# Patient Record
Sex: FEMALE | Race: BLACK OR AFRICAN AMERICAN | NOT HISPANIC OR LATINO | Employment: FULL TIME | URBAN - METROPOLITAN AREA
[De-identification: names, ages, dates, MRNs, and addresses within clinical notes are randomized per-mention and may not be internally consistent; named-entity substitution may affect disease eponyms.]

---

## 2023-04-28 ENCOUNTER — APPOINTMENT (OUTPATIENT)
Dept: PRIMARY CARE | Facility: CLINIC | Age: 28
End: 2023-04-28
Payer: COMMERCIAL

## 2023-05-09 ENCOUNTER — OFFICE VISIT (OUTPATIENT)
Dept: PRIMARY CARE | Facility: CLINIC | Age: 28
End: 2023-05-09
Payer: COMMERCIAL

## 2023-05-09 ENCOUNTER — LAB (OUTPATIENT)
Dept: LAB | Facility: LAB | Age: 28
End: 2023-05-09
Payer: COMMERCIAL

## 2023-05-09 VITALS
HEART RATE: 78 BPM | BODY MASS INDEX: 43.53 KG/M2 | SYSTOLIC BLOOD PRESSURE: 133 MMHG | DIASTOLIC BLOOD PRESSURE: 81 MMHG | TEMPERATURE: 97.5 F | WEIGHT: 230.4 LBS | OXYGEN SATURATION: 98 %

## 2023-05-09 DIAGNOSIS — Z02.1 PHYSICAL EXAM, PRE-EMPLOYMENT: ICD-10-CM

## 2023-05-09 DIAGNOSIS — Z00.00 HEALTHCARE MAINTENANCE: ICD-10-CM

## 2023-05-09 DIAGNOSIS — Z00.00 HEALTHCARE MAINTENANCE: Primary | ICD-10-CM

## 2023-05-09 DIAGNOSIS — Z30.9 ENCOUNTER FOR CONTRACEPTIVE MANAGEMENT, UNSPECIFIED TYPE: ICD-10-CM

## 2023-05-09 DIAGNOSIS — Z59.41 FOOD INSECURITY: ICD-10-CM

## 2023-05-09 LAB
ESTIMATED AVERAGE GLUCOSE FOR HBA1C: 103 MG/DL
HEMOGLOBIN A1C/HEMOGLOBIN TOTAL IN BLOOD: 5.2 %
HEPATITIS C VIRUS AB PRESENCE IN SERUM: NONREACTIVE
PREGNANCY TEST URINE, POC: NEGATIVE

## 2023-05-09 PROCEDURE — 99395 PREV VISIT EST AGE 18-39: CPT | Performed by: STUDENT IN AN ORGANIZED HEALTH CARE EDUCATION/TRAINING PROGRAM

## 2023-05-09 PROCEDURE — 1036F TOBACCO NON-USER: CPT | Performed by: STUDENT IN AN ORGANIZED HEALTH CARE EDUCATION/TRAINING PROGRAM

## 2023-05-09 PROCEDURE — 87661 TRICHOMONAS VAGINALIS AMPLIF: CPT

## 2023-05-09 PROCEDURE — 90471 IMMUNIZATION ADMIN: CPT | Performed by: STUDENT IN AN ORGANIZED HEALTH CARE EDUCATION/TRAINING PROGRAM

## 2023-05-09 PROCEDURE — 86803 HEPATITIS C AB TEST: CPT

## 2023-05-09 PROCEDURE — 83036 HEMOGLOBIN GLYCOSYLATED A1C: CPT

## 2023-05-09 PROCEDURE — 90677 PCV20 VACCINE IM: CPT | Performed by: STUDENT IN AN ORGANIZED HEALTH CARE EDUCATION/TRAINING PROGRAM

## 2023-05-09 PROCEDURE — 36415 COLL VENOUS BLD VENIPUNCTURE: CPT

## 2023-05-09 PROCEDURE — 87591 N.GONORRHOEAE DNA AMP PROB: CPT

## 2023-05-09 PROCEDURE — 87491 CHLMYD TRACH DNA AMP PROBE: CPT

## 2023-05-09 PROCEDURE — 99214 OFFICE O/P EST MOD 30 MIN: CPT | Performed by: STUDENT IN AN ORGANIZED HEALTH CARE EDUCATION/TRAINING PROGRAM

## 2023-05-09 PROCEDURE — 81025 URINE PREGNANCY TEST: CPT | Performed by: STUDENT IN AN ORGANIZED HEALTH CARE EDUCATION/TRAINING PROGRAM

## 2023-05-09 PROCEDURE — 86481 TB AG RESPONSE T-CELL SUSP: CPT

## 2023-05-09 RX ORDER — ALBUTEROL SULFATE 90 UG/1
AEROSOL, METERED RESPIRATORY (INHALATION)
COMMUNITY
End: 2023-10-19

## 2023-05-09 SDOH — ECONOMIC STABILITY: FOOD INSECURITY: WITHIN THE PAST 12 MONTHS, THE FOOD YOU BOUGHT JUST DIDN'T LAST AND YOU DIDN'T HAVE MONEY TO GET MORE.: OFTEN TRUE

## 2023-05-09 SDOH — ECONOMIC STABILITY: FOOD INSECURITY: WITHIN THE PAST 12 MONTHS, YOU WORRIED THAT YOUR FOOD WOULD RUN OUT BEFORE YOU GOT MONEY TO BUY MORE.: OFTEN TRUE

## 2023-05-09 SDOH — ECONOMIC STABILITY - FOOD INSECURITY: FOOD INSECURITY: Z59.41

## 2023-05-09 ASSESSMENT — PATIENT HEALTH QUESTIONNAIRE - PHQ9
SUM OF ALL RESPONSES TO PHQ9 QUESTIONS 1 & 2: 0
2. FEELING DOWN, DEPRESSED OR HOPELESS: NOT AT ALL
1. LITTLE INTEREST OR PLEASURE IN DOING THINGS: NOT AT ALL

## 2023-05-09 ASSESSMENT — PAIN SCALES - GENERAL: PAINLEVEL: 0-NO PAIN

## 2023-05-09 NOTE — PROGRESS NOTES
Myles Tompkins is 28 y.o. female who is here today for a  visit.   Last HM visit was 1 years ago.   Concerns today include: sign work permit, wishes to get CT scan of her ribs      Health: good  Vision: No issues   Hearing: No issues   Diet: describes as unhealthy diet due to quality of food    Exercise: no regular exercise   Menstrual: premenopause, regular, LMP today, monthly menses      Contraception: not currently on birth control, doesn't wish to be pregnant, would like to be on the patch, not interested in LARC or other forms of birth control,   Sexual activity: sexually inactive , used to be active with male partners, didn't consistently use condoms   Smoking Hx: never   ETOH Hx: no ETOH  ID: none    Depressions screening:   Over the past 2 weeks how often have you been bothered by any of the following problems:   - little interest or pleasure in doing things: Not at all    - feeling down, depressed, or hopeless: Not at all     Food insecurity screening:   During the last 2 weeks have you:   - worried that your food would run out and you wouldn''y have money to buy more: Yes  - you didn't have enough money to buy food: Yes     Family Hx of breast cancer: second degree relative  (states that almost everyone on her mother's side of the family has breast cancer, grandmother and aunties)   Family Hx of colon cancer: none  (unsure)   Other significant FHx:  No history of migraine of clotting     # Review of systems:   Review of systems is negative besides what is mentioned in the HPI      # Objectives:   Visit Vitals  /81   Pulse 78   Temp 36.4 °C (97.5 °F) (Tympanic)   Wt 105 kg (230 lb 6.4 oz)   SpO2 98%   BMI 43.53 kg/m²   Smoking Status Never   BSA 2.13 m²        # Physical exam:   General: Alert and oriented*3, not in acute distress   Cardiac: S1, S2 normal, no murmurs, no lower extremity edema.  Respiratory: CTAB, no wheezing or crackles   MSK:   Psych: appropriate mood and affect to the  clinical setting      # Assessment and plan:   Myles Tompkins is 28 y.o. female with a PMHx significant for: asthma, GERD, chronic rib pain, and seasonal allergies presenting today for preventive care visit.   Concerns for this visit: rib pain, on chart review, it was noted that the patient had prior clinic visits for tjhis, as well as ED visits for this, we agreed on focusing on the HM visit today and scheduling another visit to address this visit a different time. She was agreeable to this.     Immunizations due today: PCV-20, and COVID vaccine, we had a lengthy discussion about the importance of COVID vaccine, patient still declined.         PCV-20 administered today      Healthy diet (DASH) and exercise recommendation of 150 minutes moderate intensity discussed with the patient.    Labs obtained today: HCV Antibody, HBA1C, GC/ Chlamydia, trichomonas   IO HCG negative.     # Cancer screening:   Colon cancer screening: Not due   Breast cancer screening: Not due   Cervical cancer screening: due today, patient would like to schedule this for another visit     # Birth control:   Discussed LARC as a more reliable option with the patient however she was not interested in that   Discussed benefits and risks associated with the patch, patient would like to proceed  Rx for the hormonal patch (twirla) provided today     # Pre- employment testing of [employer] ordered today, paper work will be filled out once labs resulted, and will call patient to come pick it up     # Referral to food for life given positive food insecurity screening     # HTN: 133/81  Stage I, no need for medications   Has been over 130 systolic over the past year in different office visits   No need for medication at this time   Advised patient to adhere to lifestyle modification and exercise     Return to clinic for follow up in 3 months or sooner if needed.     Patient's HPI, physical exam and plan of care was discussed with the attending physician  Dr. Megan Peña MD  Family Medicine, PGY-3  [facility]

## 2023-05-10 LAB
CHLAMYDIA TRACH., AMPLIFIED: NEGATIVE
N. GONORRHEA, AMPLIFIED: NEGATIVE
TRICHOMONAS VAGINALIS: NEGATIVE

## 2023-05-11 NOTE — PROGRESS NOTES
I saw and evaluated the patient. I personally obtained the key and critical portions of the history and physical exam or was physically present for key and critical portions performed by the resident/fellow. I reviewed the resident/fellow's documentation and discussed the patient with the resident/fellow. I agree with the resident/fellow's medical decision making as documented in the note.    Phu Guzman MD

## 2023-05-12 LAB
NIL(NEG) CONTROL SPOT COUNT: NORMAL
PANEL A SPOT COUNT: 0
PANEL B SPOT COUNT: 0
POS CONTROL SPOT COUNT: NORMAL
T-SPOT. TB INTERPRETATION: NEGATIVE

## 2023-05-26 ENCOUNTER — APPOINTMENT (OUTPATIENT)
Dept: PRIMARY CARE | Facility: CLINIC | Age: 28
End: 2023-05-26
Payer: COMMERCIAL

## 2023-06-02 ENCOUNTER — APPOINTMENT (OUTPATIENT)
Dept: PRIMARY CARE | Facility: CLINIC | Age: 28
End: 2023-06-02
Payer: COMMERCIAL

## 2023-08-11 ENCOUNTER — OFFICE VISIT (OUTPATIENT)
Dept: PRIMARY CARE | Facility: CLINIC | Age: 28
End: 2023-08-11
Payer: COMMERCIAL

## 2023-08-11 VITALS
HEART RATE: 81 BPM | BODY MASS INDEX: 44.29 KG/M2 | HEIGHT: 61 IN | SYSTOLIC BLOOD PRESSURE: 123 MMHG | OXYGEN SATURATION: 100 % | TEMPERATURE: 97.1 F | WEIGHT: 234.6 LBS | DIASTOLIC BLOOD PRESSURE: 82 MMHG

## 2023-08-11 DIAGNOSIS — M94.0 COSTOCHONDRITIS: ICD-10-CM

## 2023-08-11 DIAGNOSIS — K21.9 GASTROESOPHAGEAL REFLUX DISEASE WITHOUT ESOPHAGITIS: Primary | ICD-10-CM

## 2023-08-11 PROBLEM — L21.9 SEBORRHEIC DERMATITIS: Status: ACTIVE | Noted: 2023-08-11

## 2023-08-11 PROBLEM — R09.89 SYMPTOMS OF UPPER RESPIRATORY INFECTION (URI): Status: ACTIVE | Noted: 2023-08-11

## 2023-08-11 PROBLEM — N62 MACROMASTIA: Status: ACTIVE | Noted: 2023-08-11

## 2023-08-11 PROBLEM — E66.01 MORBID OBESITY (MULTI): Status: ACTIVE | Noted: 2023-08-11

## 2023-08-11 PROBLEM — Z59.41 FOOD INSECURITY: Status: ACTIVE | Noted: 2023-08-11

## 2023-08-11 PROBLEM — R07.81 RIB PAIN: Status: ACTIVE | Noted: 2023-08-11

## 2023-08-11 PROBLEM — L30.9 ECZEMA OF FACE: Status: ACTIVE | Noted: 2023-08-11

## 2023-08-11 PROCEDURE — 99214 OFFICE O/P EST MOD 30 MIN: CPT | Performed by: STUDENT IN AN ORGANIZED HEALTH CARE EDUCATION/TRAINING PROGRAM

## 2023-08-11 PROCEDURE — 1036F TOBACCO NON-USER: CPT | Performed by: STUDENT IN AN ORGANIZED HEALTH CARE EDUCATION/TRAINING PROGRAM

## 2023-08-11 RX ORDER — OMEPRAZOLE 20 MG/1
20 TABLET, DELAYED RELEASE ORAL DAILY
Qty: 30 TABLET | Refills: 11 | Status: SHIPPED | COMMUNITY
Start: 2023-08-11 | End: 2023-08-18

## 2023-08-11 RX ORDER — ACETAMINOPHEN 325 MG/1
TABLET ORAL
COMMUNITY
End: 2023-11-09

## 2023-08-11 RX ORDER — NORETHINDRONE ACETATE AND ETHINYL ESTRADIOL AND FERROUS FUMARATE 1MG-20(21)
KIT ORAL
COMMUNITY
Start: 2023-07-30 | End: 2024-06-07 | Stop reason: ALTCHOICE

## 2023-08-11 RX ORDER — NORELGESTROMIN AND ETHINYL ESTRADIOL 35; 150 UG/D; UG/D
PATCH TRANSDERMAL
COMMUNITY
Start: 2022-11-30 | End: 2024-06-07 | Stop reason: ALTCHOICE

## 2023-08-11 RX ORDER — LIDOCAINE 40 MG/G
CREAM TOPICAL 4 TIMES DAILY PRN
Qty: 40 G | Refills: 3 | Status: SHIPPED | OUTPATIENT
Start: 2023-08-11 | End: 2024-08-10

## 2023-08-11 RX ORDER — DICLOFENAC SODIUM 10 MG/G
4 GEL TOPICAL 4 TIMES DAILY
Qty: 100 G | Refills: 1 | Status: SHIPPED | OUTPATIENT
Start: 2023-08-11 | End: 2023-10-17

## 2023-08-11 ASSESSMENT — ENCOUNTER SYMPTOMS: HEADACHES: 1

## 2023-08-11 ASSESSMENT — PAIN SCALES - GENERAL: PAINLEVEL: 4

## 2023-08-11 NOTE — PROGRESS NOTES
"  Subjective   Patient ID: Myles Tompkins is a 28 y.o. female who presents for Med Refill and Referral (CT Scan,  MRI, Blood Work). Follow-up reason: MRI?, CT?, acid reflux    1. 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 Headaches  Taking excedrin, which is effective  No FH migraines  Bilateral bitemporal    3. GERD    Med Refill  Associated symptoms include headaches.        Review of Systems   Neurological:  Positive for headaches.   All other systems reviewed and are negative.      Objective   /82 (BP Location: Right arm, Patient Position: Sitting, BP Cuff Size: Large adult)   Pulse 81   Temp 36.2 °C (97.1 °F) (Temporal)   Ht 1.549 m (5' 1\")   Wt 106 kg (234 lb 9.6 oz)   SpO2 100%   BMI 44.33 kg/m²     Physical Exam  Constitutional:       Appearance: Normal appearance.   HENT:      Head: Normocephalic and atraumatic.      Mouth/Throat:      Mouth: Mucous membranes are moist.      Pharynx: Oropharynx is clear.   Eyes:      Extraocular Movements: Extraocular movements intact.      Conjunctiva/sclera: Conjunctivae normal.      Pupils: Pupils are equal, round, and reactive to light.   Cardiovascular:      Rate and Rhythm: Normal rate and regular rhythm.      Pulses: Normal pulses.      Heart sounds: Normal heart sounds.   Pulmonary:      Effort: Pulmonary effort is normal.      Breath sounds: Normal breath sounds.   Abdominal:      General: Abdomen is flat. Bowel sounds are normal.      Palpations: Abdomen is soft.   Musculoskeletal:         General: Normal range of motion.      Cervical back: Normal range of motion and neck supple.      " Comments: TTP bilateral costal margin in the distribution of her bra   Skin:     General: Skin is warm and dry.      Capillary Refill: Capillary refill takes less than 2 seconds.   Neurological:      General: No focal deficit present.      Mental Status: She is alert.   Psychiatric:         Mood and Affect: Mood normal.         Behavior: Behavior normal.         Assessment/Plan   Problem List Items Addressed This Visit       GERD (gastroesophageal reflux disease) - Primary    Relevant Medications    omeprazole OTC (PriLOSEC OTC) 20 mg EC tablet     Other Visit Diagnoses       Costochondritis        Relevant Medications    lidocaine (LMX 4) 4 % cream    diclofenac sodium (Voltaren) 1 % gel gel                   -------------------------------------------------------------------------------    **This note was entered into the electronic medical record using Dragon medical dictation software, and was not edited thereafter. Please excuse any errors of spelling or grammar.**    -------------------------------------------------------------------------------    OVERALL PLAN:  [ ] voltaren 1%, lidocaine 4% mary  [ ] Counseled patient to stop wearing tight-fitting bra around the midsection. May consider referral to breast surgery for breast reduction counseling for macromastia.  [ ] omeprazole 20 mg for GERD

## 2023-08-18 DIAGNOSIS — K21.9 GASTROESOPHAGEAL REFLUX DISEASE WITHOUT ESOPHAGITIS: Primary | ICD-10-CM

## 2023-08-18 RX ORDER — PANTOPRAZOLE SODIUM 40 MG/1
40 TABLET, DELAYED RELEASE ORAL 2 TIMES DAILY
Qty: 60 TABLET | Refills: 11 | Status: SHIPPED | OUTPATIENT
Start: 2023-08-18 | End: 2024-08-17

## 2023-08-31 ENCOUNTER — HOSPITAL ENCOUNTER (EMERGENCY)
Dept: DATA CONVERSION | Facility: HOSPITAL | Age: 28
Discharge: HOME | End: 2023-08-31
Attending: EMERGENCY MEDICINE
Payer: COMMERCIAL

## 2023-08-31 DIAGNOSIS — R51.9 HEADACHE, UNSPECIFIED: ICD-10-CM

## 2023-08-31 DIAGNOSIS — R11.2 NAUSEA WITH VOMITING, UNSPECIFIED: ICD-10-CM

## 2023-10-06 ENCOUNTER — APPOINTMENT (OUTPATIENT)
Dept: NUTRITION | Facility: HOSPITAL | Age: 28
End: 2023-10-06
Payer: COMMERCIAL

## 2023-10-13 ENCOUNTER — APPOINTMENT (OUTPATIENT)
Dept: NUTRITION | Facility: HOSPITAL | Age: 28
End: 2023-10-13
Payer: COMMERCIAL

## 2023-10-16 DIAGNOSIS — M94.0 COSTOCHONDRITIS: ICD-10-CM

## 2023-10-16 DIAGNOSIS — J45.909 ASTHMA, UNSPECIFIED ASTHMA SEVERITY, UNSPECIFIED WHETHER COMPLICATED, UNSPECIFIED WHETHER PERSISTENT (HHS-HCC): Primary | ICD-10-CM

## 2023-10-17 RX ORDER — DICLOFENAC SODIUM 10 MG/G
4 GEL TOPICAL 4 TIMES DAILY
Qty: 100 G | Refills: 1 | Status: SHIPPED | OUTPATIENT
Start: 2023-10-17 | End: 2023-12-18

## 2023-10-19 DIAGNOSIS — J45.909 ASTHMA, UNSPECIFIED ASTHMA SEVERITY, UNSPECIFIED WHETHER COMPLICATED, UNSPECIFIED WHETHER PERSISTENT (HHS-HCC): ICD-10-CM

## 2023-10-19 RX ORDER — ALBUTEROL SULFATE 90 UG/1
1 AEROSOL, METERED RESPIRATORY (INHALATION) EVERY 6 HOURS PRN
Qty: 18 G | Refills: 11 | Status: SHIPPED | OUTPATIENT
Start: 2023-10-19 | End: 2023-11-18

## 2023-10-19 RX ORDER — ALBUTEROL SULFATE 90 UG/1
1 AEROSOL, METERED RESPIRATORY (INHALATION) EVERY 6 HOURS PRN
Qty: 18 G | Refills: 11 | Status: SHIPPED | OUTPATIENT
Start: 2023-10-19 | End: 2023-10-19 | Stop reason: SDUPTHER

## 2023-11-09 ENCOUNTER — APPOINTMENT (OUTPATIENT)
Dept: RADIOLOGY | Facility: HOSPITAL | Age: 28
End: 2023-11-09
Payer: COMMERCIAL

## 2023-11-09 ENCOUNTER — HOSPITAL ENCOUNTER (EMERGENCY)
Facility: HOSPITAL | Age: 28
Discharge: HOME | End: 2023-11-09
Attending: EMERGENCY MEDICINE
Payer: COMMERCIAL

## 2023-11-09 VITALS
SYSTOLIC BLOOD PRESSURE: 116 MMHG | HEART RATE: 67 BPM | WEIGHT: 233 LBS | TEMPERATURE: 97.5 F | DIASTOLIC BLOOD PRESSURE: 79 MMHG | RESPIRATION RATE: 16 BRPM | HEIGHT: 61 IN | OXYGEN SATURATION: 98 % | BODY MASS INDEX: 43.99 KG/M2

## 2023-11-09 DIAGNOSIS — G44.209 ACUTE NON INTRACTABLE TENSION-TYPE HEADACHE: Primary | ICD-10-CM

## 2023-11-09 LAB — PREGNANCY TEST URINE, POC: NEGATIVE

## 2023-11-09 PROCEDURE — 99285 EMERGENCY DEPT VISIT HI MDM: CPT | Mod: 25 | Performed by: EMERGENCY MEDICINE

## 2023-11-09 PROCEDURE — 99284 EMERGENCY DEPT VISIT MOD MDM: CPT | Mod: 25

## 2023-11-09 PROCEDURE — 99284 EMERGENCY DEPT VISIT MOD MDM: CPT | Performed by: EMERGENCY MEDICINE

## 2023-11-09 PROCEDURE — 70450 CT HEAD/BRAIN W/O DYE: CPT | Performed by: STUDENT IN AN ORGANIZED HEALTH CARE EDUCATION/TRAINING PROGRAM

## 2023-11-09 PROCEDURE — 81025 URINE PREGNANCY TEST: CPT

## 2023-11-09 PROCEDURE — 2500000004 HC RX 250 GENERAL PHARMACY W/ HCPCS (ALT 636 FOR OP/ED): Mod: SE

## 2023-11-09 PROCEDURE — 2500000001 HC RX 250 WO HCPCS SELF ADMINISTERED DRUGS (ALT 637 FOR MEDICARE OP): Mod: SE

## 2023-11-09 PROCEDURE — 70450 CT HEAD/BRAIN W/O DYE: CPT

## 2023-11-09 PROCEDURE — 96372 THER/PROPH/DIAG INJ SC/IM: CPT

## 2023-11-09 RX ORDER — KETOROLAC TROMETHAMINE 30 MG/ML
30 INJECTION, SOLUTION INTRAMUSCULAR; INTRAVENOUS ONCE
Status: COMPLETED | OUTPATIENT
Start: 2023-11-09 | End: 2023-11-09

## 2023-11-09 RX ORDER — METOCLOPRAMIDE 10 MG/1
10 TABLET ORAL ONCE
Status: COMPLETED | OUTPATIENT
Start: 2023-11-09 | End: 2023-11-09

## 2023-11-09 RX ORDER — ACETAMINOPHEN 325 MG/1
650 TABLET ORAL EVERY 6 HOURS PRN
Qty: 56 TABLET | Refills: 0 | Status: SHIPPED | OUTPATIENT
Start: 2023-11-09 | End: 2023-11-23

## 2023-11-09 RX ORDER — IBUPROFEN 600 MG/1
600 TABLET ORAL EVERY 6 HOURS PRN
Qty: 56 TABLET | Refills: 0 | Status: SHIPPED | OUTPATIENT
Start: 2023-11-09 | End: 2023-11-23

## 2023-11-09 RX ADMIN — KETOROLAC TROMETHAMINE 30 MG: 30 INJECTION, SOLUTION INTRAMUSCULAR; INTRAVENOUS at 20:34

## 2023-11-09 RX ADMIN — METOCLOPRAMIDE 10 MG: 10 TABLET ORAL at 20:34

## 2023-11-09 ASSESSMENT — PAIN - FUNCTIONAL ASSESSMENT: PAIN_FUNCTIONAL_ASSESSMENT: 0-10

## 2023-11-09 ASSESSMENT — COLUMBIA-SUICIDE SEVERITY RATING SCALE - C-SSRS
2. HAVE YOU ACTUALLY HAD ANY THOUGHTS OF KILLING YOURSELF?: NO
6. HAVE YOU EVER DONE ANYTHING, STARTED TO DO ANYTHING, OR PREPARED TO DO ANYTHING TO END YOUR LIFE?: NO
1. IN THE PAST MONTH, HAVE YOU WISHED YOU WERE DEAD OR WISHED YOU COULD GO TO SLEEP AND NOT WAKE UP?: NO

## 2023-11-09 ASSESSMENT — PAIN DESCRIPTION - DESCRIPTORS: DESCRIPTORS: PRESSURE

## 2023-11-09 ASSESSMENT — LIFESTYLE VARIABLES
HAVE PEOPLE ANNOYED YOU BY CRITICIZING YOUR DRINKING: NO
REASON UNABLE TO ASSESS: NO
EVER HAD A DRINK FIRST THING IN THE MORNING TO STEADY YOUR NERVES TO GET RID OF A HANGOVER: NO
EVER FELT BAD OR GUILTY ABOUT YOUR DRINKING: NO
HAVE YOU EVER FELT YOU SHOULD CUT DOWN ON YOUR DRINKING: NO

## 2023-11-09 ASSESSMENT — PAIN SCALES - GENERAL: PAINLEVEL_OUTOF10: 10 - WORST POSSIBLE PAIN

## 2023-11-09 ASSESSMENT — PAIN DESCRIPTION - LOCATION: LOCATION: HEAD

## 2023-11-10 NOTE — ED PROVIDER NOTES
CC: Headache     History provided by: Patient  Limitations to History: None    HPI:  Patient 28-year-old female with a PMH of previous headaches, asthma, GERD, and allergies who presents to the ED for CC of headache.  She reports that she began to have a gradual onset headache starting approximately 2 PM in the frontal region and spreading to the occipital region.  The headache was gradual in onset, not the worst headache of her life. Patient reports an episode of coughing today afterwhich she became nauseous and had 1 episode of nonbloody nonbilious vomiting.  After the episode of emesis she developed a brief episode of blurred vision that spontaneously resolved.  Patient took a nap without relief of her headache and then presented to the ED.  She denies any current nausea or blurred vision. She reports a history of previous headaches but has not had one in a couple months.  Her previous headaches have involved blurry vision, nausea, and vomiting as well. No trauma, falls, or injuries. No passing out. Patient denies current blurry vision, nausea, neck stiffness, neck stiffness, chest pain, shortness of breath, diarrhea, abdominal pain, urinary symptoms, numbness, tingling, fevers, or current cough.    External Records Reviewed: Previous ED visits, inpatient records, and outpatient records  ???????????????????????????????????????????????????????????????  Triage Vitals:  T 36.4 °C (97.5 °F)  HR 67  /79  RR 16  O2 98 % None (Room air)    Physical Exam  Constitutional:       General: She is not in acute distress.     Appearance: She is not ill-appearing, toxic-appearing or diaphoretic.   HENT:      Head: Normocephalic and atraumatic.   Eyes:      Extraocular Movements: Extraocular movements intact.      Pupils: Pupils are equal, round, and reactive to light.   Cardiovascular:      Rate and Rhythm: Normal rate and regular rhythm.      Heart sounds: Normal heart sounds. No murmur heard.     No gallop.    Pulmonary:      Effort: Pulmonary effort is normal. No respiratory distress.   Musculoskeletal:      Cervical back: Normal range of motion. No rigidity.   Skin:     General: Skin is warm and dry.      Capillary Refill: Capillary refill takes 2 to 3 seconds.      Findings: No rash.   Neurological:      Mental Status: She is alert and oriented to person, place, and time.      GCS: GCS eye subscore is 4. GCS verbal subscore is 5. GCS motor subscore is 6.      Cranial Nerves: Cranial nerves 2-12 are intact. No cranial nerve deficit.      Sensory: Sensation is intact. No sensory deficit.      Motor: Motor function is intact. No weakness.      Coordination: Coordination is intact. Finger-Nose-Finger Test normal.      Gait: Gait is intact. Gait normal.        ???????????????????????????????????????????????????????????????  ED Course/Treatment/Medical Decision Making      Differential diagnoses considered include but ar not limited to: Migraines, tension  headache, SAH, intracranial mass, meningitis    Social Determinants Limiting Care:  None identified         ED Course:  Diagnoses as of 11/10/23 0019   Acute non intractable tension-type headache       MDM:  Patient is a 20-year-old female with above PMH who presents to the ED for CC of headache.  Upon arrival patient's vital signs are within normal limits, she is no acute distress, and is nontoxic-appearing.  Examination remarkable for no focal neurological deficits. Cranial nerves are intact, and no signs of meningismus. Patient is seen resting calmly in no acute distress. Given history and physical examination SAH is less likely today.  Patient's headache will be treated with IM Toradol and p.o. Reglan.  Patient reports that she was instructed in the past that she should get a CTH for frequent headaches but has not done so.  Patient was instructed that we did not feel like a CTH was warranted today based on examination and history.  Patient still requested and elected  to proceed with CT head therefore was ordered.  CT head was negative for acute intracranial process.  Patient reports that her pain level is currently at a 0 upon repeat evaluation. She reported feeling much better. She remains neurologically intact and tolerated po challenge. She felt comfortable being discharged home. Patient will be discharged home in stable condition with appropriate follow-up care with neurology headache clinic.  She was given Rx for ibuprofen and Tylenol.  Return precautions were provided, for which the patient expressed understanding. The patient was discharged home in stable condition. They should feel free to return to the Emergency Department at any time should their condition worsen or should they have any questions or concerns.     Impression:  Diagnoses as of 11/10/23 0019   Acute non intractable tension-type headache       Disposition:  Discharge home    Wil Rojas DO   Emergency Medicine, PGY-1       Procedures ? [employer] last updated 11/9/2023 8:10 PM          Wil Rojas DO  Resident  11/10/23 0023       Magalie Thacker MD  11/10/23 0938

## 2023-11-10 NOTE — ED TRIAGE NOTES
Patient reports a headache that began around 2pm today. States she tried to take a nap to get rid of the pain but when she woke up it was still there. She did not try any medication at home to relieve the pain. Also endorses nausea.

## 2023-11-28 ENCOUNTER — APPOINTMENT (OUTPATIENT)
Dept: NUTRITION | Facility: HOSPITAL | Age: 28
End: 2023-11-28
Payer: COMMERCIAL

## 2023-12-16 DIAGNOSIS — M94.0 COSTOCHONDRITIS: ICD-10-CM

## 2023-12-18 RX ORDER — DICLOFENAC SODIUM 10 MG/G
4 GEL TOPICAL 4 TIMES DAILY
Qty: 100 G | Refills: 1 | Status: SHIPPED | OUTPATIENT
Start: 2023-12-18 | End: 2024-05-03

## 2024-02-23 ENCOUNTER — APPOINTMENT (OUTPATIENT)
Dept: PRIMARY CARE | Facility: CLINIC | Age: 29
End: 2024-02-23
Payer: COMMERCIAL

## 2024-04-05 ENCOUNTER — APPOINTMENT (OUTPATIENT)
Dept: PRIMARY CARE | Facility: CLINIC | Age: 29
End: 2024-04-05
Payer: COMMERCIAL

## 2024-04-19 ENCOUNTER — APPOINTMENT (OUTPATIENT)
Dept: PRIMARY CARE | Facility: CLINIC | Age: 29
End: 2024-04-19
Payer: COMMERCIAL

## 2024-05-01 DIAGNOSIS — M94.0 COSTOCHONDRITIS: ICD-10-CM

## 2024-05-03 RX ORDER — DICLOFENAC SODIUM 10 MG/G
4 GEL TOPICAL 4 TIMES DAILY
Qty: 100 G | Refills: 1 | Status: SHIPPED | OUTPATIENT
Start: 2024-05-03

## 2024-06-03 ASSESSMENT — ENCOUNTER SYMPTOMS
ORTHOPNEA: 0
SWOLLEN GLANDS: 0
LEG PAIN: 0
SHORTNESS OF BREATH: 1
ABDOMINAL PAIN: 0
RHINORRHEA: 0
SORE THROAT: 0
VOMITING: 0
NECK PAIN: 0
HEADACHES: 1
CLAUDICATION: 0
SYNCOPE: 0
SPUTUM PRODUCTION: 1
HEMOPTYSIS: 0
PND: 0
FEVER: 0

## 2024-06-07 ENCOUNTER — LAB (OUTPATIENT)
Dept: LAB | Facility: LAB | Age: 29
End: 2024-06-07
Payer: COMMERCIAL

## 2024-06-07 ENCOUNTER — OFFICE VISIT (OUTPATIENT)
Dept: PRIMARY CARE | Facility: CLINIC | Age: 29
End: 2024-06-07
Payer: COMMERCIAL

## 2024-06-07 VITALS
SYSTOLIC BLOOD PRESSURE: 124 MMHG | BODY MASS INDEX: 46.44 KG/M2 | HEART RATE: 84 BPM | WEIGHT: 246 LBS | DIASTOLIC BLOOD PRESSURE: 72 MMHG | OXYGEN SATURATION: 98 % | HEIGHT: 61 IN

## 2024-06-07 DIAGNOSIS — R73.9 HYPERGLYCEMIA: ICD-10-CM

## 2024-06-07 DIAGNOSIS — J30.2 SEASONAL ALLERGIES: ICD-10-CM

## 2024-06-07 DIAGNOSIS — E66.01 MORBID OBESITY (MULTI): ICD-10-CM

## 2024-06-07 DIAGNOSIS — J45.40 MODERATE PERSISTENT ASTHMA WITHOUT COMPLICATION (HHS-HCC): Primary | ICD-10-CM

## 2024-06-07 LAB
EST. AVERAGE GLUCOSE BLD GHB EST-MCNC: 108 MG/DL
HBA1C MFR BLD: 5.4 %

## 2024-06-07 PROCEDURE — 1036F TOBACCO NON-USER: CPT | Performed by: STUDENT IN AN ORGANIZED HEALTH CARE EDUCATION/TRAINING PROGRAM

## 2024-06-07 PROCEDURE — 36415 COLL VENOUS BLD VENIPUNCTURE: CPT

## 2024-06-07 PROCEDURE — 99214 OFFICE O/P EST MOD 30 MIN: CPT | Performed by: STUDENT IN AN ORGANIZED HEALTH CARE EDUCATION/TRAINING PROGRAM

## 2024-06-07 PROCEDURE — 83036 HEMOGLOBIN GLYCOSYLATED A1C: CPT

## 2024-06-07 RX ORDER — MOMETASONE FUROATE 220 UG/1
2 INHALANT RESPIRATORY (INHALATION) DAILY
Qty: 1 EACH | Refills: 3 | Status: SHIPPED | OUTPATIENT
Start: 2024-06-07

## 2024-06-07 ASSESSMENT — ENCOUNTER SYMPTOMS
ORTHOPNEA: 0
NECK PAIN: 0
RHINORRHEA: 0
SHORTNESS OF BREATH: 1
LEG PAIN: 0
SORE THROAT: 0
PND: 0
SYNCOPE: 0
LOSS OF SENSATION IN FEET: 0
DEPRESSION: 0
CLAUDICATION: 0
VOMITING: 0
OCCASIONAL FEELINGS OF UNSTEADINESS: 0
SWOLLEN GLANDS: 0
SPUTUM PRODUCTION: 1
ABDOMINAL PAIN: 0
FEVER: 0
HEMOPTYSIS: 0
HEADACHES: 1

## 2024-06-07 ASSESSMENT — PATIENT HEALTH QUESTIONNAIRE - PHQ9
SUM OF ALL RESPONSES TO PHQ9 QUESTIONS 1 AND 2: 0
2. FEELING DOWN, DEPRESSED OR HOPELESS: NOT AT ALL
1. LITTLE INTEREST OR PLEASURE IN DOING THINGS: NOT AT ALL

## 2024-06-07 ASSESSMENT — PAIN SCALES - GENERAL: PAINLEVEL: 8

## 2024-06-07 NOTE — PROGRESS NOTES
Subjective   Patient ID: Myles Tompkins is a 29 y.o. female who presents for Follow-up (Weight loss meds, allergy meds).     Interval changes:    1. Moderate, persistent ashtma  Using albuterol 3-4 times a day, then 2-3 times a night  Waking up coughing  Coughing up mucous that is mostly clear-white, sometimes yellow  AKERS and SOB at rest  Wheezing and coughing fits  POS for ED visits for asthma, also admissions    2. Seasonal allergies  2nd gen agents ineffective  Benadryl works previously  Itchy eyes, puffy eyes, sneezing  Worse summer time  Grass is a trigger  Also has eczema  May be dusts in her house  No pets, no tobacco exposure  Son at home also has eczema    3. Morbid obesity, class III, BMI 46  States she ha always been larger  Many family members with DM2  Mother on GLP1-RA currently, seeing results    A/  Weight >200# since starting with our practice in 2022  A1c 5.4%, not yet Pre-DM range  Hyperglycemia on multiple B/CMP      Shortness of Breath  This is a recurrent problem. The current episode started 1 to 4 weeks ago. The problem occurs intermittently. The problem has been resolved. The average episode lasts 2 minutes. Associated symptoms include headaches and sputum production. Pertinent negatives include no abdominal pain, chest pain, claudication, coryza, ear pain, fever, hemoptysis, leg pain, leg swelling, neck pain, orthopnea, PND, rash, rhinorrhea, sore throat, swollen glands, syncope or vomiting. The symptoms are aggravated by weather changes.            Review of Systems   Constitutional:  Negative for fever.   HENT:  Negative for ear pain, rhinorrhea and sore throat.    Respiratory:  Positive for sputum production and shortness of breath. Negative for hemoptysis.    Cardiovascular:  Negative for chest pain, orthopnea, claudication, leg swelling, syncope and PND.   Gastrointestinal:  Negative for abdominal pain and vomiting.   Musculoskeletal:  Negative for neck pain.   Skin:  Negative  "for rash.   Neurological:  Positive for headaches.       Objective   /72   Pulse 84   Ht 1.549 m (5' 1\")   Wt 112 kg (246 lb)   SpO2 98%   BMI 46.48 kg/m²     Physical Exam  Vitals reviewed.   Constitutional:       Appearance: Normal appearance. She is obese.      Comments: Class III obesity, BMI 46     HENT:      Head: Normocephalic and atraumatic.      Mouth/Throat:      Mouth: Mucous membranes are moist.      Pharynx: Oropharynx is clear.   Eyes:      Extraocular Movements: Extraocular movements intact.      Conjunctiva/sclera: Conjunctivae normal.      Pupils: Pupils are equal, round, and reactive to light.   Cardiovascular:      Rate and Rhythm: Normal rate and regular rhythm.      Pulses: Normal pulses.      Heart sounds: Normal heart sounds.   Pulmonary:      Effort: Pulmonary effort is normal.      Breath sounds: Normal breath sounds.   Abdominal:      General: Abdomen is flat. Bowel sounds are normal.      Palpations: Abdomen is soft.   Musculoskeletal:         General: Normal range of motion.      Cervical back: Normal range of motion and neck supple.   Skin:     General: Skin is warm and dry.      Capillary Refill: Capillary refill takes less than 2 seconds.      Comments: Dandruff of scalp   Neurological:      General: No focal deficit present.      Mental Status: She is alert.   Psychiatric:         Mood and Affect: Mood normal.         Behavior: Behavior normal.         Assessment/Plan   Problem List Items Addressed This Visit             ICD-10-CM    Asthma ([facility]) - Primary J45.909    Relevant Medications    mometasone (Asmanex Twisthaler) 220 mcg/ actuation (60)i inhaler    Morbid obesity (Multi) E66.01    Relevant Orders    Hemoglobin A1c     Other Visit Diagnoses         Codes    Seasonal allergies     J30.2    Relevant Medications    diphenhydrAMINE (BENADryl) 50 mg tablet    Hyperglycemia     R73.9    Relevant Orders    Hemoglobin A1c                       OVERALL PLAN:    [ ] " Searched insurance formulary, added preferred agent Asmanex (ICS) to regimen. Continue with albuterol PRN. Patient counseled to report back if still having frequent night time awakening or other symptoms for titration or adjustment of regimen.    [ ] Benadryl ordered for seasonal allergies and exacerbation of asthma. 2nd generation agents not effective in the past.    [ ] A1c ordered today for hyperglycemia and MOB. Will start ordering process for GLP-1 RA following results. Will check formulary.      -------------------------------------------------------------------------------------------------------------------    **This note was entered into the electronic medical record using Dragon medical dictation software, and was not edited thereafter. Please excuse any errors of spelling or grammar.**    -------------------------------------------------------------------------------------------------------------------

## 2024-06-24 DIAGNOSIS — E66.01 OBESITY, CLASS III, BMI 40-49.9 (MORBID OBESITY) (MULTI): Primary | ICD-10-CM

## 2024-06-24 NOTE — PROGRESS NOTES
"Following lab results showing on DM or Pre-DM, will proceed with trial of ordering victoza for weight loss in MOB class III. Last urine HCG test was 7 months ago, so needs updated one to proceed. Patient notified via [employer] that order placed and she can go to any  lab to complete. Otherwise she can come to [facility], in which case the order will need to be changed by any provider to \"in clinic\".  "

## 2024-07-05 DIAGNOSIS — J30.2 SEASONAL ALLERGIES: ICD-10-CM

## 2024-07-08 RX ORDER — DIPHENHYDRAMINE HCL 50 MG/1
CAPSULE ORAL
Qty: 30 CAPSULE | Refills: 1 | Status: SHIPPED | OUTPATIENT
Start: 2024-07-08

## 2024-07-31 ENCOUNTER — APPOINTMENT (OUTPATIENT)
Dept: PRIMARY CARE | Facility: CLINIC | Age: 29
End: 2024-07-31
Payer: COMMERCIAL

## 2024-08-26 ENCOUNTER — HOSPITAL ENCOUNTER (EMERGENCY)
Facility: HOSPITAL | Age: 29
Discharge: HOME | End: 2024-08-26
Payer: COMMERCIAL

## 2024-08-26 VITALS
RESPIRATION RATE: 16 BRPM | HEIGHT: 61 IN | WEIGHT: 237 LBS | DIASTOLIC BLOOD PRESSURE: 80 MMHG | SYSTOLIC BLOOD PRESSURE: 130 MMHG | BODY MASS INDEX: 44.75 KG/M2 | HEART RATE: 66 BPM | TEMPERATURE: 98.1 F

## 2024-08-26 DIAGNOSIS — Z34.90 INTRAUTERINE PREGNANCY (HHS-HCC): Primary | ICD-10-CM

## 2024-08-26 LAB
APPEARANCE UR: ABNORMAL
BILIRUB UR STRIP.AUTO-MCNC: NEGATIVE MG/DL
COLOR UR: YELLOW
GLUCOSE UR STRIP.AUTO-MCNC: NORMAL MG/DL
KETONES UR STRIP.AUTO-MCNC: NEGATIVE MG/DL
LEUKOCYTE ESTERASE UR QL STRIP.AUTO: NEGATIVE
MUCOUS THREADS #/AREA URNS AUTO: ABNORMAL /LPF
NITRITE UR QL STRIP.AUTO: NEGATIVE
PH UR STRIP.AUTO: 6 [PH]
PREGNANCY TEST URINE, POC: POSITIVE
PROT UR STRIP.AUTO-MCNC: ABNORMAL MG/DL
RBC # UR STRIP.AUTO: NEGATIVE /UL
RBC #/AREA URNS AUTO: ABNORMAL /HPF
SP GR UR STRIP.AUTO: 1.03
SQUAMOUS #/AREA URNS AUTO: ABNORMAL /HPF
UROBILINOGEN UR STRIP.AUTO-MCNC: NORMAL MG/DL
WBC #/AREA URNS AUTO: ABNORMAL /HPF

## 2024-08-26 PROCEDURE — 99283 EMERGENCY DEPT VISIT LOW MDM: CPT

## 2024-08-26 PROCEDURE — 81001 URINALYSIS AUTO W/SCOPE: CPT | Performed by: EMERGENCY MEDICINE

## 2024-08-26 PROCEDURE — 99285 EMERGENCY DEPT VISIT HI MDM: CPT

## 2024-08-26 PROCEDURE — 76815 OB US LIMITED FETUS(S): CPT

## 2024-08-26 PROCEDURE — 81025 URINE PREGNANCY TEST: CPT | Performed by: EMERGENCY MEDICINE

## 2024-08-26 RX ORDER — ACETAMINOPHEN 325 MG/1
650 TABLET ORAL EVERY 6 HOURS PRN
Qty: 20 TABLET | Refills: 0 | Status: SHIPPED | OUTPATIENT
Start: 2024-08-26 | End: 2024-08-31

## 2024-08-26 RX ORDER — FAMOTIDINE 40 MG/1
40 TABLET, FILM COATED ORAL NIGHTLY PRN
Qty: 10 TABLET | Refills: 0 | Status: SHIPPED | OUTPATIENT
Start: 2024-08-26 | End: 2024-09-05

## 2024-08-26 ASSESSMENT — PAIN - FUNCTIONAL ASSESSMENT: PAIN_FUNCTIONAL_ASSESSMENT: 0-10

## 2024-08-26 ASSESSMENT — COLUMBIA-SUICIDE SEVERITY RATING SCALE - C-SSRS
2. HAVE YOU ACTUALLY HAD ANY THOUGHTS OF KILLING YOURSELF?: NO
1. IN THE PAST MONTH, HAVE YOU WISHED YOU WERE DEAD OR WISHED YOU COULD GO TO SLEEP AND NOT WAKE UP?: NO
6. HAVE YOU EVER DONE ANYTHING, STARTED TO DO ANYTHING, OR PREPARED TO DO ANYTHING TO END YOUR LIFE?: NO

## 2024-08-26 ASSESSMENT — PAIN DESCRIPTION - DIRECTION: RADIATING_TOWARDS: VAGINA

## 2024-08-26 ASSESSMENT — PAIN DESCRIPTION - LOCATION: LOCATION: ABDOMEN

## 2024-08-26 ASSESSMENT — PAIN SCALES - GENERAL: PAINLEVEL_OUTOF10: 5 - MODERATE PAIN

## 2024-08-26 ASSESSMENT — PAIN DESCRIPTION - PAIN TYPE: TYPE: ACUTE PAIN

## 2024-08-26 NOTE — ED PROVIDER NOTES
HPI   Chief Complaint   Patient presents with    Abdominal Pain    Vaginal Pain       29-year-old female with history of asthma presents for chief complaint of lower abdominal discomfort with breast tenderness and missed menstrual period.  States that the pain is epigastric but also lower abdominal to a lesser extent.  She denies any vaginal discharge or bleeding.  States that she suspect she may be pregnant.  Denies any injury.  Denies nausea or vomiting.  Denies changes in urination or bowel movement.  Currently G1, P1.  LMP approximately 7/14/2024.  Denies any fevers.  Denies chest pain or dyspnea.  No syncope or dizziness.              Patient History   Past Medical History:   Diagnosis Date    Contact with and (suspected) exposure to infections with a predominantly sexual mode of transmission 12/29/2017    Exposure to STD    Diseases of the respiratory system complicating pregnancy, unspecified trimester ([facility]) 01/13/2021    Asthma affecting pregnancy, antepartum    Encounter for contraceptive management, unspecified 11/09/2016    Encounter for contraceptive management    Encounter for contraceptive management, unspecified 01/07/2022    Contraception management    Encounter for immunization 01/07/2019    Need for MMRV (measles-mumps-rubella-varicella) vaccine/ProQuad vaccination    Encounter for immunization 01/07/2019    Need for immunization against unspecified combinations of infectious diseases    Encounter for screening for infections with a predominantly sexual mode of transmission 06/24/2020    Routine screening for STI (sexually transmitted infection)    Encounter for screening for infections with a predominantly sexual mode of transmission 04/21/2016    Encounter for screening examination for sexually transmitted disease    Encounter for supervision of normal first pregnancy, second trimester ([facility]) 01/13/2021    Encounter for prenatal care in second trimester of first pregnancy    Encounter for  supervision of normal first pregnancy, unspecified trimester ([facility]) 06/24/2020    Pregnancy, first    Local infection of the skin and subcutaneous tissue, unspecified 11/05/2018    Infected hand    Maternal care for other (suspected) fetal abnormality and damage, fetal cardiac anomalies, not applicable or unspecified ([facility]) 01/13/2021    Abnormal fetal echocardiogram affecting antepartum care of mother    Migraine without aura, not intractable, without status migrainosus 09/17/2016    Migraine without aura and without status migrainosus, not intractable    Obesity complicating pregnancy, unspecified trimester ([facility]) 01/13/2021    Obesity in pregnancy, antepartum    Other conditions influencing health status 06/24/2020    History of pregnancy    Other conditions influencing health status 12/03/2013    Chronic Gonococcal Cervicitis    Other specified postprocedural states 03/27/2020    History of Papanicolaou smear    Personal history of diseases of the skin and subcutaneous tissue 08/16/2013    History of acne    Personal history of other diseases of the respiratory system 02/01/2018    History of asthma    Personal history of other diseases of the respiratory system 05/23/2016    History of allergic rhinitis    Personal history of other endocrine, nutritional and metabolic disease 09/25/2020    History of obesity    Personal history of other endocrine, nutritional and metabolic disease 05/03/2016    History of hyperglycemia    Personal history of other infectious and parasitic diseases 04/03/2020    History of trichomonal vaginitis    Unspecified pre-existing hypertension complicating pregnancy, unspecified trimester ([facility]) 02/22/2021    Chronic hypertension in pregnancy     No past surgical history on file.  No family history on file.  Social History     Tobacco Use    Smoking status: Never    Smokeless tobacco: Never   Substance Use Topics    Alcohol use: Never    Drug use: Never       Physical Exam    ED Triage Vitals [08/26/24 1239]   Temperature Heart Rate Respirations BP   36.7 °C (98.1 °F) 66 16 130/80      SpO2 Temp Source Heart Rate Source Patient Position   -- Temporal -- --      BP Location FiO2 (%)     -- --       Physical Exam  Vitals and nursing note reviewed.   Constitutional:       General: She is not in acute distress.     Appearance: She is well-developed.   HENT:      Head: Normocephalic and atraumatic.   Eyes:      Conjunctiva/sclera: Conjunctivae normal.   Cardiovascular:      Rate and Rhythm: Normal rate and regular rhythm.      Heart sounds: No murmur heard.  Pulmonary:      Effort: Pulmonary effort is normal. No respiratory distress.      Breath sounds: Normal breath sounds.   Abdominal:      Palpations: Abdomen is soft.      Tenderness: There is abdominal tenderness in the epigastric area. There is no right CVA tenderness, left CVA tenderness or guarding. Negative signs include Adam's sign and McBurney's sign.   Musculoskeletal:         General: No swelling.      Cervical back: Neck supple.   Skin:     General: Skin is warm and dry.      Capillary Refill: Capillary refill takes less than 2 seconds.   Neurological:      Mental Status: She is alert.   Psychiatric:         Mood and Affect: Mood normal.           ED Course & MDM   Diagnoses as of 08/26/24 1339   Intrauterine pregnancy ([facility])                 No data recorded     Beatriz Coma Scale Score: 15 (08/26/24 1242 : Jamal Lawrence, CARLOS)                           Medical Decision Making  Vital signs reviewed, unremarkable at this time.  Patient is well-appearing and in no apparent distress.  Speaks full sentences without difficulty.  Diagnostic testing performed.  Urine pregnancy test positive.  Ultrasound shows positive IUP with no evidence of free fluid.  Patient was notified that she is pregnant.  Encouraged to follow-up with women's health.  Tylenol and Pepcid prescriptions given.  Advised to return to the ED with any new or  worsening symptoms and to follow-up with soon as possible.  Patient in agreement this plan.  States she feels comfortable with this and is ready to go home.  Discharged stable condition.        Procedure  Procedures     Bryce Noble, CELIA-LOIS  08/26/24 6737

## 2024-08-26 NOTE — ED TRIAGE NOTES
Patient states that she has been having stabbing pain in her stomach and her vagina.  She said that this has been ongoing since yesterday.  LMP 7/14/24. She is unsure if she is pregnant.  No pain with urination or discharge per her endorsement.

## 2024-08-26 NOTE — ED PROCEDURE NOTE
Procedure    Performed by: ELSA Lorenzo  Authorized by: ELSA Lorenzo    Procedure: Pelvic Ultrasound    Exam: transabdominal exam  Findings:  IUP: The pelvis was visualized and there was an INTRAUTERINE PREGNANCY visualized (a yolk sac, fetal pole or fetus was seen).  Fetal heart rate (bpm): fetal pole visualized.  Pelvic Free Fluid: The pelvis was visualized and was NEGATIVE for free fluid.    Impression:  Pelvis: The focused pelvic ultrasound showed an INTRAUTERINE PREGNANCY.                   ELSA Lorenzo  08/26/24 1347

## 2024-09-04 ENCOUNTER — DOCUMENTATION (OUTPATIENT)
Dept: OBSTETRICS AND GYNECOLOGY | Facility: HOSPITAL | Age: 29
End: 2024-09-04
Payer: COMMERCIAL

## 2024-09-09 ENCOUNTER — APPOINTMENT (OUTPATIENT)
Dept: OBSTETRICS AND GYNECOLOGY | Facility: CLINIC | Age: 29
End: 2024-09-09
Payer: COMMERCIAL

## 2024-09-18 ENCOUNTER — INITIAL PRENATAL (OUTPATIENT)
Dept: OBSTETRICS AND GYNECOLOGY | Facility: HOSPITAL | Age: 29
End: 2024-09-18
Payer: COMMERCIAL

## 2024-09-18 ENCOUNTER — LAB (OUTPATIENT)
Dept: LAB | Facility: LAB | Age: 29
End: 2024-09-18
Payer: COMMERCIAL

## 2024-09-18 VITALS — BODY MASS INDEX: 45.91 KG/M2 | DIASTOLIC BLOOD PRESSURE: 79 MMHG | SYSTOLIC BLOOD PRESSURE: 119 MMHG | WEIGHT: 243 LBS

## 2024-09-18 DIAGNOSIS — I10 CHRONIC HYPERTENSION: ICD-10-CM

## 2024-09-18 DIAGNOSIS — O10.919 CHRONIC HYPERTENSION IN PREGNANCY (HHS-HCC): ICD-10-CM

## 2024-09-18 DIAGNOSIS — J45.909 ASTHMA, UNSPECIFIED ASTHMA SEVERITY, UNSPECIFIED WHETHER COMPLICATED, UNSPECIFIED WHETHER PERSISTENT (HHS-HCC): ICD-10-CM

## 2024-09-18 DIAGNOSIS — O34.219 UTERINE SCAR FROM PREVIOUS CESAREAN DELIVERY AFFECTING PREGNANCY (HHS-HCC): ICD-10-CM

## 2024-09-18 DIAGNOSIS — Z12.4 SCREENING FOR CERVICAL CANCER: ICD-10-CM

## 2024-09-18 DIAGNOSIS — Z87.59 PREVIOUS BABY WITH FETAL GROWTH RESTRICTION: ICD-10-CM

## 2024-09-18 DIAGNOSIS — Z3A.09 9 WEEKS GESTATION OF PREGNANCY (HHS-HCC): ICD-10-CM

## 2024-09-18 DIAGNOSIS — G43.009 MIGRAINE WITHOUT AURA, NOT INTRACTABLE, WITHOUT STATUS MIGRAINOSUS: ICD-10-CM

## 2024-09-18 DIAGNOSIS — Z3A.09 9 WEEKS GESTATION OF PREGNANCY (HHS-HCC): Primary | ICD-10-CM

## 2024-09-18 DIAGNOSIS — O99.210 OBESITY IN PREGNANCY (HHS-HCC): ICD-10-CM

## 2024-09-18 DIAGNOSIS — Z86.19 HISTORY OF PCR DNA POSITIVE FOR HSV1: ICD-10-CM

## 2024-09-18 PROBLEM — L21.9 SEBORRHEIC DERMATITIS: Status: RESOLVED | Noted: 2023-08-11 | Resolved: 2024-09-18

## 2024-09-18 PROBLEM — R09.89 SYMPTOMS OF UPPER RESPIRATORY INFECTION (URI): Status: RESOLVED | Noted: 2023-08-11 | Resolved: 2024-09-18

## 2024-09-18 PROBLEM — K21.9 GERD (GASTROESOPHAGEAL REFLUX DISEASE): Status: RESOLVED | Noted: 2023-08-11 | Resolved: 2024-09-18

## 2024-09-18 PROBLEM — N62 MACROMASTIA: Status: RESOLVED | Noted: 2023-08-11 | Resolved: 2024-09-18

## 2024-09-18 PROBLEM — E66.01 MORBID OBESITY (MULTI): Status: RESOLVED | Noted: 2023-08-11 | Resolved: 2024-09-18

## 2024-09-18 PROBLEM — R07.81 RIB PAIN: Status: RESOLVED | Noted: 2023-08-11 | Resolved: 2024-09-18

## 2024-09-18 LAB
ABO GROUP (TYPE) IN BLOOD: NORMAL
ALBUMIN SERPL BCP-MCNC: 4 G/DL (ref 3.4–5)
ALP SERPL-CCNC: 96 U/L (ref 33–110)
ALT SERPL W P-5'-P-CCNC: 24 U/L (ref 7–45)
ANION GAP SERPL CALC-SCNC: 14 MMOL/L (ref 10–20)
ANTIBODY SCREEN: NORMAL
AST SERPL W P-5'-P-CCNC: 15 U/L (ref 9–39)
BILIRUB SERPL-MCNC: 0.5 MG/DL (ref 0–1.2)
BUN SERPL-MCNC: 8 MG/DL (ref 6–23)
CALCIUM SERPL-MCNC: 9.4 MG/DL (ref 8.6–10.6)
CHLORIDE SERPL-SCNC: 103 MMOL/L (ref 98–107)
CO2 SERPL-SCNC: 24 MMOL/L (ref 21–32)
CREAT SERPL-MCNC: 0.51 MG/DL (ref 0.5–1.05)
CREAT UR-MCNC: 181.3 MG/DL (ref 20–320)
EGFRCR SERPLBLD CKD-EPI 2021: >90 ML/MIN/1.73M*2
ERYTHROCYTE [DISTWIDTH] IN BLOOD BY AUTOMATED COUNT: 12.5 % (ref 11.5–14.5)
EST. AVERAGE GLUCOSE BLD GHB EST-MCNC: 100 MG/DL
GLUCOSE SERPL-MCNC: 100 MG/DL (ref 74–99)
HBA1C MFR BLD: 5.1 %
HBV SURFACE AG SERPL QL IA: NONREACTIVE
HCT VFR BLD AUTO: 39.7 % (ref 36–46)
HCV AB SER QL: NONREACTIVE
HGB BLD-MCNC: 13.2 G/DL (ref 12–16)
HIV 1+2 AB+HIV1 P24 AG SERPL QL IA: NONREACTIVE
LDH SERPL L TO P-CCNC: 134 U/L (ref 84–246)
MCH RBC QN AUTO: 31.1 PG (ref 26–34)
MCHC RBC AUTO-ENTMCNC: 33.2 G/DL (ref 32–36)
MCV RBC AUTO: 93 FL (ref 80–100)
NRBC BLD-RTO: 0 /100 WBCS (ref 0–0)
PLATELET # BLD AUTO: 364 X10*3/UL (ref 150–450)
POTASSIUM SERPL-SCNC: 3.9 MMOL/L (ref 3.5–5.3)
PROT SERPL-MCNC: 6.9 G/DL (ref 6.4–8.2)
PROT UR-ACNC: 12 MG/DL (ref 5–24)
PROT/CREAT UR: 0.07 MG/MG CREAT (ref 0–0.17)
RBC # BLD AUTO: 4.25 X10*6/UL (ref 4–5.2)
REFLEX ADDED, ANEMIA PANEL: NORMAL
RH FACTOR (ANTIGEN D): NORMAL
RUBV IGG SERPL IA-ACNC: 2.6 IA
RUBV IGG SERPL QL IA: POSITIVE
SODIUM SERPL-SCNC: 137 MMOL/L (ref 136–145)
TREPONEMA PALLIDUM IGG+IGM AB [PRESENCE] IN SERUM OR PLASMA BY IMMUNOASSAY: NONREACTIVE
URATE SERPL-MCNC: 5 MG/DL (ref 2.3–6.7)
WBC # BLD AUTO: 11.4 X10*3/UL (ref 4.4–11.3)

## 2024-09-18 PROCEDURE — 83021 HEMOGLOBIN CHROMOTOGRAPHY: CPT

## 2024-09-18 PROCEDURE — 87086 URINE CULTURE/COLONY COUNT: CPT

## 2024-09-18 PROCEDURE — 86803 HEPATITIS C AB TEST: CPT

## 2024-09-18 PROCEDURE — 87389 HIV-1 AG W/HIV-1&-2 AB AG IA: CPT

## 2024-09-18 PROCEDURE — 36415 COLL VENOUS BLD VENIPUNCTURE: CPT

## 2024-09-18 PROCEDURE — 85027 COMPLETE CBC AUTOMATED: CPT

## 2024-09-18 PROCEDURE — 83615 LACTATE (LD) (LDH) ENZYME: CPT

## 2024-09-18 PROCEDURE — 82570 ASSAY OF URINE CREATININE: CPT

## 2024-09-18 PROCEDURE — 86900 BLOOD TYPING SEROLOGIC ABO: CPT

## 2024-09-18 PROCEDURE — 86901 BLOOD TYPING SEROLOGIC RH(D): CPT

## 2024-09-18 PROCEDURE — 83020 HEMOGLOBIN ELECTROPHORESIS: CPT

## 2024-09-18 PROCEDURE — 84550 ASSAY OF BLOOD/URIC ACID: CPT

## 2024-09-18 PROCEDURE — 86780 TREPONEMA PALLIDUM: CPT

## 2024-09-18 PROCEDURE — 80053 COMPREHEN METABOLIC PANEL: CPT

## 2024-09-18 PROCEDURE — 87661 TRICHOMONAS VAGINALIS AMPLIF: CPT

## 2024-09-18 PROCEDURE — 87491 CHLMYD TRACH DNA AMP PROBE: CPT

## 2024-09-18 PROCEDURE — 87340 HEPATITIS B SURFACE AG IA: CPT

## 2024-09-18 PROCEDURE — 83036 HEMOGLOBIN GLYCOSYLATED A1C: CPT

## 2024-09-18 PROCEDURE — 86850 RBC ANTIBODY SCREEN: CPT

## 2024-09-18 PROCEDURE — 99213 OFFICE O/P EST LOW 20 MIN: CPT

## 2024-09-18 PROCEDURE — 86317 IMMUNOASSAY INFECTIOUS AGENT: CPT

## 2024-09-18 SDOH — ECONOMIC STABILITY: FOOD INSECURITY: WITHIN THE PAST 12 MONTHS, YOU WORRIED THAT YOUR FOOD WOULD RUN OUT BEFORE YOU GOT MONEY TO BUY MORE.: SOMETIMES TRUE

## 2024-09-18 SDOH — ECONOMIC STABILITY: TRANSPORTATION INSECURITY
IN THE PAST 12 MONTHS, HAS LACK OF TRANSPORTATION KEPT YOU FROM MEETINGS, WORK, OR FROM GETTING THINGS NEEDED FOR DAILY LIVING?: NO

## 2024-09-18 SDOH — ECONOMIC STABILITY: TRANSPORTATION INSECURITY
IN THE PAST 12 MONTHS, HAS THE LACK OF TRANSPORTATION KEPT YOU FROM MEDICAL APPOINTMENTS OR FROM GETTING MEDICATIONS?: NO

## 2024-09-18 SDOH — ECONOMIC STABILITY: FOOD INSECURITY: WITHIN THE PAST 12 MONTHS, THE FOOD YOU BOUGHT JUST DIDN'T LAST AND YOU DIDN'T HAVE MONEY TO GET MORE.: SOMETIMES TRUE

## 2024-09-18 ASSESSMENT — LIFESTYLE VARIABLES
SKIP TO QUESTIONS 9-10: 1
AUDIT-C TOTAL SCORE: 0
HOW OFTEN DO YOU HAVE SIX OR MORE DRINKS ON ONE OCCASION: NEVER
HOW OFTEN DO YOU HAVE A DRINK CONTAINING ALCOHOL: NEVER
HOW MANY STANDARD DRINKS CONTAINING ALCOHOL DO YOU HAVE ON A TYPICAL DAY: PATIENT DOES NOT DRINK

## 2024-09-18 ASSESSMENT — EDINBURGH POSTNATAL DEPRESSION SCALE (EPDS)
I HAVE BEEN ANXIOUS OR WORRIED FOR NO GOOD REASON: NO, NOT AT ALL
TOTAL SCORE: 3
I HAVE BLAMED MYSELF UNNECESSARILY WHEN THINGS WENT WRONG: NOT VERY OFTEN
I HAVE BEEN ABLE TO LAUGH AND SEE THE FUNNY SIDE OF THINGS: AS MUCH AS I ALWAYS COULD
I HAVE FELT SCARED OR PANICKY FOR NO GOOD REASON: NO, NOT MUCH
I HAVE BEEN SO UNHAPPY THAT I HAVE HAD DIFFICULTY SLEEPING: NOT AT ALL
I HAVE LOOKED FORWARD WITH ENJOYMENT TO THINGS: AS MUCH AS I EVER DID
I HAVE FELT SAD OR MISERABLE: NO, NOT AT ALL
I HAVE BEEN SO UNHAPPY THAT I HAVE BEEN CRYING: NO, NEVER
THINGS HAVE BEEN GETTING ON TOP OF ME: NO, MOST OF THE TIME I HAVE COPED QUITE WELL
THE THOUGHT OF HARMING MYSELF HAS OCCURRED TO ME: NEVER

## 2024-09-18 NOTE — LETTER
9/18/2024    To Whom It May Concern:    Myles Tompkins is being followed for her pregnancy at [facility].  Estimated Date of Delivery: 4/22/25    Sincerely,        ROCKY Lawson  [facility]

## 2024-09-18 NOTE — LETTER
9/18/2024    To Whom It May Concern:    This is to certify that my patient,Myles Tompkins is under my care for her pregnancy.    The following guidelines may be used for any dental work:  You may use a local anesthetic with or without Epinephrine.  You may prescribe any Penicillin or Cephalosporin if an antibiotic is necessary. (Dependent on allergy history)  You may take x-rays with a lead apron if necessary.  You may prescribe Tylenol and/or narcotics for pain.  You may extract teeth if necessary.    If you need further information or if you have any concerns, please contact our office.    Sincerely,        ROCKY Lawson  [facility]

## 2024-09-18 NOTE — PROGRESS NOTES
Subjective   Myles Tompkins is a 29 y.o.  at 9w1d with a working estimated date of delivery of 2025, by Last Menstrual Period who presents for an initial prenatal visit. This pregnancy is unplanned and accepted.    Patient currently experiencing: nausea/vomitting, declines anti-emetics, fatigue  Bleeding or cramping since LMP: no  Taking prenatal vitamin: No  Other concerns today: N/A  Ultrasound completed this pregnancy: No  Last pap: 3/27/2020; completed today    OB History    Para Term  AB Living   2 1 1     1   SAB IAB Ectopic Multiple Live Births           1      # Outcome Date GA Lbr Ankit/2nd Weight Sex Type Anes PTL Lv   2 Current            1 Term 20   2.466 kg M CS-Unspec EPI  CLARISA     Prior pregnancy complications: chronic hypertension, fetal growth restriction, obesity  History of hypertension:  Yes, chronic hypertension    Past Medical History:   Diagnosis Date    Asthma ([facility]) 2021    Chronic hypertension 2021    Hx of gonorrhea 2013    Hx of trichomonal vaginitis 2020    Maternal care for other (suspected) fetal abnormality and damage, fetal cardiac anomalies, not applicable or unspecified ([facility]) 2021    Abnormal fetal echocardiogram affecting antepartum care of mother    Migraine without aura, not intractable, without status migrainosus 2016    Personal history of other endocrine, nutritional and metabolic disease 2016    History of hyperglycemia      History reviewed. No pertinent surgical history.   Social History     Socioeconomic History    Marital status: Single   Tobacco Use    Smoking status: Never    Smokeless tobacco: Never   Vaping Use    Vaping status: Never Used   Substance and Sexual Activity    Alcohol use: Never    Drug use: Never     Social Determinants of Health     Financial Resource Strain: Not on File (4/3/2024)    Received from [facility]    Financial Resource Strain     Financial Resource  Strain: 0   Food Insecurity: Food Insecurity Present (2024)    Hunger Vital Sign     Worried About Running Out of Food in the Last Year: Sometimes true     Ran Out of Food in the Last Year: Sometimes true   Transportation Needs: No Transportation Needs (2024)    PRAPARE - Transportation     Lack of Transportation (Medical): No     Lack of Transportation (Non-Medical): No   Physical Activity: Not on File (4/3/2024)    Received from [employer]    Physical Activity     Physical Activity: 0   Stress: Not on File (4/3/2024)    Received from [employer]    Stress     Stress: 0   Social Connections: Not on File (2024)    Received from [employer]    Social Connections     Connectedness: 0   Intimate Partner Violence: Not At Risk (2024)    Humiliation, Afraid, Rape, and Kick questionnaire     Fear of Current or Ex-Partner: No     Emotionally Abused: No     Physically Abused: No     Sexually Abused: No      [place]  Depression Scale Total: 3    Objective   Physical Exam  Weight: 110 kg (243 lb)  TWG: Not found.   Pregravid BMI: Could not be calculated  BP: 119/79    Physical Exam  Constitutional:       Appearance: Normal appearance.   Genitourinary:      Vulva, rectum and urethral meatus normal.      No vaginal prolapse present.     No vaginal atrophy present.     Pelvic exam was performed with patient in the lithotomy position and normal support.   HENT:      Head: Normocephalic and atraumatic.      Mouth/Throat:      Mouth: Mucous membranes are moist.   Cardiovascular:      Rate and Rhythm: Normal rate and regular rhythm.      Heart sounds: Normal heart sounds.   Pulmonary:      Effort: Pulmonary effort is normal.      Breath sounds: Normal breath sounds.   Musculoskeletal:         General: Normal range of motion.      Cervical back: Normal range of motion and neck supple.   Neurological:      Mental Status: She is alert and oriented to person, place, and time.   Skin:     General: Skin is warm  and dry.   Psychiatric:         Mood and Affect: Mood normal.         Behavior: Behavior normal.         Thought Content: Thought content normal.         Judgment: Judgment normal.          Assessment   Problem List Items Addressed This Visit       Asthma ([facility])    Overview     No hospitalizations or intubations per patient's recollection         Chronic hypertension    Overview     No meds  Discussed ASA; patient will think about it  Baseline HELLP labs ordered         Migraine without aura, not intractable, without status migrainosus    Obesity in pregnancy ([facility])    Overview     BMI 45  Growth ultrasounds 30, 36 weeks  Weekly  testing to begin at 34 weeks         Uterine scar from previous  delivery affecting pregnancy ([facility])    Overview     PLTCS for breech presentation  Undecided TOLAC vs rLTCS         Previous baby with fetal growth restriction    History of PCR DNA positive for HSV1    Overview     Diagnosed in 2019         9 weeks gestation of pregnancy ([facility]) - Primary    Overview     Desired provider in labor: [] CNM  [] Physician  [x] Blood Products: [x] Yes, accepts [] No, needs counseling  [x] Initial BMI: Could not be calculated   [] Prenatal Labs:   [] Cervical Cancer Screening up to date  [] Rh status:   [] Genetic Screening:    [] NT US: (11-13 wks)  [] Baby ASA (if indicated):  [] Pregnancy dated by:     [] Anatomy US: (19-20 wks)  [] Federal Sterilization consent signed (if indicated):  [] 1hr GCT at 24-28wks:  [] Rhogam (if indicated):   [] Fetal Surveillance (if indicated):  [] Tdap (27-32 wks, may be given up to 36 wks if initial window missed):   [] RSV (32-36 wks) (Sept. to end ):   [] Flu Vaccine:    [] Breastfeeding:  [] Postpartum Birth control method:   [] GBS at 36 - 37 wks:  [] 39 weeks discussion of IOL vs. Expectant management:  [] Mode of delivery ( anticipated ):           Relevant Medications    prenatal vitamin, iron-folic, 27 mg iron-800 mcg  folic acid tablet    Other Relevant Orders    [facility] OB imaging order    Hemoglobin A1C    Type And Screen    CBC Anemia Panel With Reflex,Pregnancy    Hemoglobin Identification with Path Review    Hepatitis C Antibody    Hepatitis B Surface Antigen    Rubella Antibody, IgG    Syphilis Screen with Reflex    HIV 1/2 Antigen/Antibody Screen with Reflex to Confirmation    Urine Culture    Trichomonas vaginalis, Nucleic Acid Detection    C. Trachomatis / N. Gonorrhoeae, Amplified Detection     Other Visit Diagnoses       Screening for cervical cancer        Relevant Orders    THINPREP PAP TEST (25-30)    Chronic hypertension in pregnancy ([facility])        Relevant Orders    Comprehensive Metabolic Panel    Uric Acid    Lactate Dehydrogenase    Protein, Urine Random (P:C Ratio)             Plan   - New OB resources provided and reviewed with particular attention to dietary, travel, and medication restrictions  - Oriented to practice, CNM vs. MD care  - Reviewed IOM recommendations for weight gain given pt's BMI: 11-20 pounds (BMI greater than or equal to 30)  - Reviewed bleeding precautions, warning signs, when to call provider; phone number provided  - Discussed bASA for PEC prophylaxis  - The following Rx were sent to pharmacy: PNV,    - Routine NOB labs ordered  - Additional labs added: HELLP labs  - Discussed Centering Pregnancy with patient, patient is already scheduled to begin 9/30.  Participated in previous pregnancy and really enjoyed it  - Nuchal Translucency ultrasound ordered  - Return in 4 weeks for routine prenatal care    CELIA Lawson-ALEX

## 2024-09-19 ENCOUNTER — TELEPHONE (OUTPATIENT)
Dept: OBSTETRICS AND GYNECOLOGY | Facility: HOSPITAL | Age: 29
End: 2024-09-19
Payer: COMMERCIAL

## 2024-09-19 LAB
BACTERIA UR CULT: NORMAL
C TRACH RRNA SPEC QL NAA+PROBE: NEGATIVE
HEMOGLOBIN A2: 2.8 % (ref 2–3.5)
HEMOGLOBIN A: 96.9 % (ref 95.8–98)
HEMOGLOBIN F: 0.3 % (ref 0–2)
HEMOGLOBIN IDENTIFICATION INTERPRETATION: NORMAL
N GONORRHOEA DNA SPEC QL PROBE+SIG AMP: NEGATIVE
PATH REVIEW-HGB IDENTIFICATION: NORMAL
T VAGINALIS RRNA SPEC QL NAA+PROBE: NEGATIVE

## 2024-09-19 NOTE — TELEPHONE ENCOUNTER
Patient wrote in via [employer] about results.  Verified patient by name and   Patient questioned lab results  Discussed CBC, hA1C, Rubella all WNL  Let patient know that some lab work is still in process and if abnormal that she will receive a call back  Encouraged patient to call office with any further questions or concerns  Kimberly Duckworth RN

## 2024-09-24 PROBLEM — L30.9 ECZEMA OF FACE: Status: RESOLVED | Noted: 2023-08-11 | Resolved: 2024-09-24

## 2024-09-24 NOTE — PROGRESS NOTES
Subjective   Myles Tompkins is a 29 y.o.  at 10w6d with a working estimated date of delivery of 2025, by Last Menstrual Period who presents for Centering #5.     1:1 Visit:   Doing ok. Already done with being pregnant. She denies vaginal bleeding or strong/consistent contractions.    Patient declines flu shot.     Objective   Physical Exam:   Weight: 112 kg (246 lb 1.6 oz)  Expected Total Weight Gain: 5 kg (11 lb)-9 kg (19 lb)   Pregravid BMI: 45.75  BP: 114/80                  Problem List Items Addressed This Visit       10 weeks gestation of pregnancy ([facility]) - Primary    Overview     Desired provider in labor: [] CNM  [] Physician  [x] Blood Products: [x] Yes, accepts [] No, needs counseling  [x] Initial BMI: 45.75   [x] Prenatal Labs: WNL  [] Cervical Cancer Screening up to date: 24   [x] Rh status: O+  [x] Genetic Screening:  declined   [] NT US: (11-13 wks)  [x] Baby ASA (if indicated): patient declines  [] Pregnancy dated by:     [] Anatomy US: (19-20 wks)  [] Federal Sterilization consent signed (if indicated):  [] 1hr GCT at 24-28wks:  [] Fetal Surveillance (if indicated):  [] Tdap (27-32 wks, may be given up to 36 wks if initial window missed):   [] RSV (32-36 wks) (Sept. to end of ):   [] Flu Vaccine:    [] Breastfeeding:  [] Postpartum Birth control method:   [] GBS at 36 - 37 wks:  [] 39 weeks discussion of IOL vs. Expectant management:  [] Mode of delivery ( anticipated ):              Centering Sessions #5 Plan:  -NT 10/18  -The following educational material was reviewed:  Comfort measures and relaxation options during labor  Stages of labor  Pain management options in labor  Movement in labor  -Reviewed s/sx of PTL, warning signs, fetal movement counts, and when to call provider  -Return to clinic in 2 weeks for next Centering visit or prn     1:1 total time 10min  Centering Visit total time 120min    CELIA Patterson-ALEX

## 2024-09-30 ENCOUNTER — ROUTINE PRENATAL (OUTPATIENT)
Dept: OBSTETRICS AND GYNECOLOGY | Facility: CLINIC | Age: 29
End: 2024-09-30
Payer: COMMERCIAL

## 2024-09-30 VITALS — SYSTOLIC BLOOD PRESSURE: 114 MMHG | WEIGHT: 246.1 LBS | DIASTOLIC BLOOD PRESSURE: 80 MMHG | BODY MASS INDEX: 46.5 KG/M2

## 2024-09-30 DIAGNOSIS — Z3A.10 10 WEEKS GESTATION OF PREGNANCY (HHS-HCC): Primary | ICD-10-CM

## 2024-09-30 DIAGNOSIS — Z71.85 VACCINE COUNSELING: ICD-10-CM

## 2024-09-30 DIAGNOSIS — Z28.21 INFLUENZA VACCINATION DECLINED: ICD-10-CM

## 2024-09-30 DIAGNOSIS — Z34.81 ENCOUNTER FOR SUPERVISION OF OTHER NORMAL PREGNANCY IN FIRST TRIMESTER: ICD-10-CM

## 2024-09-30 PROCEDURE — S9436 LAMAZE CLASS: HCPCS | Performed by: ADVANCED PRACTICE MIDWIFE

## 2024-09-30 PROCEDURE — 99078 GROUP HEALTH EDUCATION: CPT | Performed by: ADVANCED PRACTICE MIDWIFE

## 2024-09-30 PROCEDURE — 99213 OFFICE O/P EST LOW 20 MIN: CPT | Mod: TH | Performed by: ADVANCED PRACTICE MIDWIFE

## 2024-09-30 PROCEDURE — 99213 OFFICE O/P EST LOW 20 MIN: CPT | Performed by: ADVANCED PRACTICE MIDWIFE

## 2024-09-30 PROCEDURE — H1002 CARECOORDINATION PRENATAL: HCPCS | Performed by: ADVANCED PRACTICE MIDWIFE

## 2024-10-01 LAB
CYTOLOGY CMNT CVX/VAG CYTO-IMP: NORMAL
LAB AP HPV GENOTYPE QUESTION: NO
LAB AP HPV HR: NORMAL
LABORATORY COMMENT REPORT: NORMAL
LMP START DATE: NORMAL
PATH REPORT.TOTAL CANCER: NORMAL

## 2024-10-01 ASSESSMENT — ENCOUNTER SYMPTOMS
PARESIS: 0
LEG PAIN: 0
HEADACHES: 0
FEVER: 0
BACK PAIN: 1
ABDOMINAL PAIN: 0
WEIGHT LOSS: 0
NUMBNESS: 0
PARESTHESIAS: 0
WEAKNESS: 0
PERIANAL NUMBNESS: 0
BOWEL INCONTINENCE: 0
DYSURIA: 0
TINGLING: 0

## 2024-10-03 ENCOUNTER — APPOINTMENT (OUTPATIENT)
Dept: PRIMARY CARE | Facility: CLINIC | Age: 29
End: 2024-10-03
Payer: COMMERCIAL

## 2024-10-07 ENCOUNTER — APPOINTMENT (OUTPATIENT)
Dept: LAB | Facility: LAB | Age: 29
End: 2024-10-07
Payer: COMMERCIAL

## 2024-10-07 ENCOUNTER — PHARMACY VISIT (OUTPATIENT)
Dept: PHARMACY | Facility: CLINIC | Age: 29
End: 2024-10-07
Payer: MEDICAID

## 2024-10-07 ENCOUNTER — APPOINTMENT (OUTPATIENT)
Dept: OBSTETRICS AND GYNECOLOGY | Facility: CLINIC | Age: 29
End: 2024-10-07
Payer: COMMERCIAL

## 2024-10-07 VITALS — WEIGHT: 246 LBS | BODY MASS INDEX: 46.48 KG/M2 | DIASTOLIC BLOOD PRESSURE: 80 MMHG | SYSTOLIC BLOOD PRESSURE: 116 MMHG

## 2024-10-07 DIAGNOSIS — Z34.01 ENCOUNTER FOR SUPERVISION OF NORMAL PRIMIGRAVIDA IN FIRST TRIMESTER, ANTEPARTUM: Primary | ICD-10-CM

## 2024-10-07 DIAGNOSIS — O21.9 NAUSEA AND VOMITING IN PREGNANCY PRIOR TO 22 WEEKS GESTATION: ICD-10-CM

## 2024-10-07 DIAGNOSIS — Z3A.11 11 WEEKS GESTATION OF PREGNANCY (HHS-HCC): ICD-10-CM

## 2024-10-07 DIAGNOSIS — O10.919 CHRONIC HYPERTENSION AFFECTING PREGNANCY (HHS-HCC): ICD-10-CM

## 2024-10-07 DIAGNOSIS — Z36.0 ENCOUNTER FOR ANTENATAL SCREENING FOR CHROMOSOMAL ANOMALIES: ICD-10-CM

## 2024-10-07 PROCEDURE — 99214 OFFICE O/P EST MOD 30 MIN: CPT | Mod: TH | Performed by: NURSE PRACTITIONER

## 2024-10-07 PROCEDURE — RXMED WILLOW AMBULATORY MEDICATION CHARGE

## 2024-10-07 NOTE — PROGRESS NOTES
Subjective   Myles Tompkins is a 29 y.o.  at 12w0d with a working estimated date of delivery of 2025, by Last Menstrual Period who presents for Centering #2.     1:1 Visit:  She denies vaginal bleeding, leakage of fluid, or strong pelvic pain.    Objective   Physical Exam  Weight: 112 kg (246 lb)  Expected Total Weight Gain: 5 kg (11 lb)-9 kg (19 lb)   Pregravid BMI: 45.75  BP: 116/80  Fetal Heart Rate: 176 on BSUS      Problem List Items Addressed This Visit          Ob-Gyn Problems    11 weeks gestation of pregnancy ([facility])    Overview     Desired provider in labor: [] CNM  [] Physician  [x] Blood Products: [x] Yes, accepts [] No, needs counseling  [x] Initial BMI: 45.75   [x] Prenatal Labs: WNL  [x] Cervical Cancer Screening up to date: 24   [x] Rh status: O+  [x] Genetic Screening:  declined   [] NT US: (11-13 wks)  [x] Baby ASA (if indicated): patient declines  [] Pregnancy dated by:     [] Anatomy US: (19-20 wks)  [] Federal Sterilization consent signed (if indicated):  [] 1hr GCT at 24-28wks:  [] Fetal Surveillance (if indicated): Growth US at 28/32/36 weeks in setting of prior FGR; BPP/NST weekly starting at 34 weeks  [] Tdap (27-32 wks, may be given up to 36 wks if initial window missed):   [] RSV (32-36 wks) (Sept. to end of ):   [] Flu Vaccine:    [] Breastfeeding:  [] Postpartum Birth control method:   [] GBS at 36 - 37 wks:  [] 39 weeks discussion of IOL vs. Expectant management:  [] Mode of delivery ( anticipated ):              Other    Chronic hypertension affecting pregnancy ([facility])    Overview     No meds  Discussed ASA; patient will think about it  Baseline HELLP labs WNL at new OB, P/c ratio 0.07           Other Visit Diagnoses       Encounter for supervision of normal primigravida in first trimester, antepartum    -  Primary    Relevant Medications    prenatal vitamin, iron-folic, 27 mg iron-800 mcg folic acid tablet    Encounter for  screening for  chromosomal anomalies        Relevant Orders    [facility] Prenatal Screen    Nausea and vomiting in pregnancy prior to 22 weeks gestation        Relevant Medications    ondansetron ODT (Zofran-ODT) 4 mg disintegrating tablet          Centering Session #2 Plan:   - Reviewed routine lab results  - NIPT discussed with patient: patient desires. Pre-test genetic counseling discussed and included:   Interpretation of family and medical histories to assess the probability of disease occurrence or recurrence;   Education about inheritance, genetic testing, disease management, prevention and resources  Counseling to promote informed choices and adaptation to the risk or presented of a genetic condition  Counseling for psychological aspects of genetic testing  - Prequel ordered  - [facility] scheduled for 10/18  - RN tasked to order maternity support belt  -The following educational material was reviewed:  Common discomforts/Changes in body  Self-Care / back care  Dental health  -Reviewed reasons to call CNM on-call:  vaginal bleeding, loss of fluid, severe pelvic pain, or any questions/concerns  -RTC for next Centering visit in 4 weeks or prn     1:1 total time 10min  Centering Visit total time 120min    Laura Doe, CELIA-ALEX, APRN-CNP

## 2024-10-08 PROBLEM — O98.519 HERPES SIMPLEX TYPE 2 (HSV-2) INFECTION AFFECTING PREGNANCY, ANTEPARTUM (HHS-HCC): Status: ACTIVE | Noted: 2024-09-18

## 2024-10-08 PROBLEM — B00.9 HERPES SIMPLEX TYPE 2 (HSV-2) INFECTION AFFECTING PREGNANCY, ANTEPARTUM (HHS-HCC): Status: ACTIVE | Noted: 2024-09-18

## 2024-10-08 PROBLEM — Z3A.11 11 WEEKS GESTATION OF PREGNANCY (HHS-HCC): Status: ACTIVE | Noted: 2024-09-18

## 2024-10-08 RX ORDER — ONDANSETRON 4 MG/1
4 TABLET, ORALLY DISINTEGRATING ORAL EVERY 8 HOURS PRN
Qty: 20 TABLET | Refills: 0 | Status: SHIPPED | OUTPATIENT
Start: 2024-10-08 | End: 2024-10-15

## 2024-10-08 NOTE — PATIENT INSTRUCTIONS
Birth Options after Having a     What are my options for giving birth if I have had a prior  birth?  If you have had a  birth in the past, you have 3 possibilities for the birth of your next baby:    You can go into labor and have a vaginal birth, which is called a vaginal birth after  ().   You can go into labor but need another  during your labor.  You can choose to have another , which is called an elective repeat  birth.    What are the benefits of having a ?  Overall, your risk of having complications is less if you give birth vaginally. You will likely spend less time in the hospital and recover faster with less pain. Your baby has less chance of breathing problems shortly after a vaginal birth when compared to babies born by .    What are the risks of attempting a ?  You have a higher chance of uterine rupture (uterus opening at the old scar), which is dangerous for your baby.    Uterine rupture is very rare. Uterine rupture happens to about 7 or 8 women in every 1000 women in labor who had a previous .    If your uterus does rupture, there is a higher chance your baby will die. This mean that in 10,000 women who had a  and try to have a , 2 babies will die.    What are the benefits of having another  birth?  You can schedule when your birth will likely happen and know what to expect from surgery.    What are the risks of having another  birth?  Overall, your risk of complications is higher if you have a repeat .    You are more likely to have problems with surgery like infection, bleeding, and damage to other organs, or to have a blood clot after surgery.    You are more likely to have problems with your placenta in future pregnancies, which can lead to severe complications like bleeding, hysterectomy, and death.    In the [place], 13 out of 100,000 women who have a  and 4 out  of 100,000 women who have a vaginal birth will die from a complication during labor or birth. Death is a very rare outcome.    Are there reasons I should not attempt a ?  If you have any of the following, it is safest for you to have another :    The incision (cut) that was made on your uterus is vertical (up and down). This is rare and only likely if your  was an emergency. If the uterine incision is vertical, you have a higher chance of having a uterine rupture. The incision on your abdomen was most likely horizontal (sideways). The abdominal scar you see on your skin does not tell you if the uterine scar is horizontal or vertical.    If you have had more than one  in the past, you have a higher chance of having a uterine rupture and a lower chance of having a successful .    You had a previous uterine rupture or major surgery on your uterus.    You have a problem during this pregnancy that makes it safer to have a  birth, such as placenta previa (placenta covering the opening of your cervix).    What is the chance that I will have a successful ?  Overall, about 3 out of 4 women who try to have a  will have a successful vaginal birth. Some things in your medical history affect your chance of success.    More Likely to Have a Successful  Less Likely to Have a Successful    The reason you had your first  is unlikely to happen again. For example, your baby was in breech presentation (bottom first), you had twins, or your baby did not tolerate labor.    You have had a vaginal birth in the past either before or after your .    You go into labor on your own.    You have the baby before your due date. The reason you had your first  is likely to happen again. For example, your cervix didn't dilate all the way to 10 centimeters after you were in labor or your baby did not come down in your pelvis during pushing.    You have had more than one   in the past.    Your labor has to be induced.    You have the baby after your due date.    Your baby weighs more than 8 pounds, 13 ounces.     How do I choose if a  or repeat  is right for me?  Choosing your method of birth is an important and very personal decision. Before you make your decision, talk to your health care provider who can help you learn how high your chance of having a  is if you have labor, and how high your risk of uterine rupture is. Your chance of  success and uterine rupture will depend on what happened during your last labor. You and your family can also discuss how you feel about the following reasons to have a  or repeat .    Some Reasons to Try for a  Some Reasons to Have a Repeat    You plan to have more children after this pregnancy.  You want a shorter hospital stay.  The risk of uterine rupture is low, and you really want to have a vaginal birth if possible. It is important for you to know the date your baby will be born.  It is important to you to avoid having an emergency .  It is important to you to know exactly what to expect during birth.       Benefits if  Successful Risks if  Unsuccessful   Faster recovery after birth of the baby    Avoid major abdominal surgery     Lower risk of:  Blood loss  Infection  Blood clots  Future pregnancy complications related to multiple  deliveries (e.g. abnormal placenta)    Benefit for the baby:  Decreased breathing difficulty at birth     Tear in the uterus (uterine rupture) occurs in 0.5-0.9% women attempting trial of labor after  and:    Requires immediate  delivery  Can lead to brain injury or death of the baby  Factors that increase the risk of uterine rupture or decrease chance of  include:  -       Induction or augmentation of labor  -       Gestational age > 40 weeks  -       Macrosomia >= 4000 grams  -       Two prior C-sections    Increased  risk of:  Bleeding  Infection  Blood clots  Hysterectomy or damage to pelvic organs     Planned Repeat  Benefits Planned Repeat  Risks   Potential scheduled delivery date    Eliminates risks associated with trial of labor after    Complication risks increase with the number of previous  deliveries    Increased risk of:  Blood loss  Infection  Blood clots  Injury to other organs/blood vessels       What can I expect if I choose to attempt a ?  It is safest to have a  in a hospital so you and your baby can be monitored during labor. Signs of distress in the baby are usually the first sign of uterine rupture. Because of this, you will have continuous fetal heart rate monitoring during labor. An IV will be placed so that it is available in case there is an emergency. You should be able to have any type of pain medicine you would like.

## 2024-10-14 ENCOUNTER — APPOINTMENT (OUTPATIENT)
Dept: OBSTETRICS AND GYNECOLOGY | Facility: CLINIC | Age: 29
End: 2024-10-14
Payer: COMMERCIAL

## 2024-10-14 LAB
COMMENTS - MP RESULT TYPE: NORMAL
SCAN RESULT: NORMAL

## 2024-10-18 ENCOUNTER — HOSPITAL ENCOUNTER (OUTPATIENT)
Dept: RADIOLOGY | Facility: HOSPITAL | Age: 29
Discharge: HOME | End: 2024-10-18
Payer: COMMERCIAL

## 2024-10-18 DIAGNOSIS — O99.210 OBESITY AFFECTING PREGNANCY (HHS-HCC): ICD-10-CM

## 2024-10-18 DIAGNOSIS — Z98.891 PREVIOUS CESAREAN SECTION: ICD-10-CM

## 2024-10-18 DIAGNOSIS — Z3A.09 9 WEEKS GESTATION OF PREGNANCY (HHS-HCC): ICD-10-CM

## 2024-10-18 PROCEDURE — 76801 OB US < 14 WKS SINGLE FETUS: CPT

## 2024-10-28 ENCOUNTER — APPOINTMENT (OUTPATIENT)
Dept: OBSTETRICS AND GYNECOLOGY | Facility: CLINIC | Age: 29
End: 2024-10-28
Payer: COMMERCIAL

## 2024-10-30 PROCEDURE — RXMED WILLOW AMBULATORY MEDICATION CHARGE

## 2024-10-30 NOTE — PROGRESS NOTES
Subjective   Myles Tompkins is a 29 y.o.  at 15w6d with a working estimated date of delivery of 2025, by Last Menstrual Period who presents for Centering #3.     1:1 Visit:  She denies vaginal bleeding, leakage of fluid or contractions/strong pelvic pain.     Ready to have baby.     Objective   Physical Exam:   Weight: 112 kg (246 lb 8 oz)  Expected Total Weight Gain: 5 kg (11 lb)-9 kg (19 lb)   Pregravid BMI: 45.75  BP: 111/80    Fundal Height (cm): 17 cm         FHTs not asculted; +FM and FHTs seen on bedside US      Problem List Items Addressed This Visit       15 weeks gestation of pregnancy ([facility]) - Primary    Overview     Desired provider in labor: [] CNM  [] Physician  [x] Blood Products: [x] Yes, accepts [] No, needs counseling  [x] Initial BMI: 45.75   [x] Prenatal Labs: WNL  [x] Cervical Cancer Screening up to date: 24   [x] Rh status: O+  [x] Genetic Screening:  declined   [x] NT US: (11-13 wks): WNL  [x] Baby ASA (if indicated): patient declines  [x] Pregnancy dated by: LMP c/w 13w US    [] Anatomy US: (19-20 wks)  [] Federal Sterilization consent signed (if indicated):  [] 1hr GCT at 24-28wks:  [] Fetal Surveillance (if indicated): Growth US at 28/32/36 weeks in setting of prior FGR; BPP/NST weekly starting at 34 weeks  [] Tdap (27-32 wks, may be given up to 36 wks if initial window missed):   [] RSV (32-36 wks) (Sept. to end ):   [x] Flu Vaccine: Oct 2024 per patient     [] Breastfeeding:  [] Postpartum Birth control method:   [] GBS at 36 - 37 wks:  [] 39 weeks discussion of IOL vs. Expectant management:  [] Mode of delivery ( anticipated ):           Chronic hypertension affecting pregnancy ([facility])    Overview     No meds  Discussed ASA; patient will think about it  Baseline HELLP labs WNL at new OB, P/c ratio 0.07           Other Visit Diagnoses       Encounter for supervision of other normal pregnancy, second trimester                Centering Session #3 Plan:    -reviewed  US  -The following educational material was reviewed:  Dealing with stressors  Mental relaxation practice  Intimate partner violence    labor signs/symptoms  -Reviewed reasons to call CNM on-call:  vaginal bleeding, loss of fluid, severe pelvic pain, or any questions/concerns  -RTC for next Centering visit in 4 weeks or prn     1:1 total time 10min  Centering Visit total time 120min     CELIA Patterson-ALEX

## 2024-11-04 ENCOUNTER — PHARMACY VISIT (OUTPATIENT)
Dept: PHARMACY | Facility: CLINIC | Age: 29
End: 2024-11-04
Payer: MEDICAID

## 2024-11-04 ENCOUNTER — APPOINTMENT (OUTPATIENT)
Dept: OBSTETRICS AND GYNECOLOGY | Facility: CLINIC | Age: 29
End: 2024-11-04
Payer: COMMERCIAL

## 2024-11-04 VITALS — WEIGHT: 246.5 LBS | DIASTOLIC BLOOD PRESSURE: 80 MMHG | SYSTOLIC BLOOD PRESSURE: 111 MMHG | BODY MASS INDEX: 46.58 KG/M2

## 2024-11-04 DIAGNOSIS — Z3A.15 15 WEEKS GESTATION OF PREGNANCY (HHS-HCC): Primary | ICD-10-CM

## 2024-11-04 DIAGNOSIS — O10.919 CHRONIC HYPERTENSION AFFECTING PREGNANCY (HHS-HCC): ICD-10-CM

## 2024-11-04 DIAGNOSIS — Z34.82 ENCOUNTER FOR SUPERVISION OF OTHER NORMAL PREGNANCY, SECOND TRIMESTER: ICD-10-CM

## 2024-11-04 PROCEDURE — H1002 CARECOORDINATION PRENATAL: HCPCS | Performed by: ADVANCED PRACTICE MIDWIFE

## 2024-11-04 PROCEDURE — 99213 OFFICE O/P EST LOW 20 MIN: CPT | Mod: TH | Performed by: ADVANCED PRACTICE MIDWIFE

## 2024-11-04 PROCEDURE — 99078 GROUP HEALTH EDUCATION: CPT | Performed by: ADVANCED PRACTICE MIDWIFE

## 2024-11-04 PROCEDURE — 99213 OFFICE O/P EST LOW 20 MIN: CPT | Performed by: ADVANCED PRACTICE MIDWIFE

## 2024-11-04 ASSESSMENT — EDINBURGH POSTNATAL DEPRESSION SCALE (EPDS)
I HAVE LOOKED FORWARD WITH ENJOYMENT TO THINGS: AS MUCH AS I EVER DID
I HAVE BEEN SO UNHAPPY THAT I HAVE BEEN CRYING: NO, NEVER
TOTAL SCORE: 0
I HAVE FELT SCARED OR PANICKY FOR NO GOOD REASON: NO, NOT AT ALL
I HAVE BLAMED MYSELF UNNECESSARILY WHEN THINGS WENT WRONG: NO, NEVER
I HAVE BEEN ANXIOUS OR WORRIED FOR NO GOOD REASON: NO, NOT AT ALL
I HAVE FELT SAD OR MISERABLE: NO, NOT AT ALL
I HAVE BEEN SO UNHAPPY THAT I HAVE HAD DIFFICULTY SLEEPING: NOT AT ALL
THE THOUGHT OF HARMING MYSELF HAS OCCURRED TO ME: NEVER
I HAVE BEEN ABLE TO LAUGH AND SEE THE FUNNY SIDE OF THINGS: AS MUCH AS I ALWAYS COULD
THINGS HAVE BEEN GETTING ON TOP OF ME: NO, I HAVE BEEN COPING AS WELL AS EVER

## 2024-11-11 ENCOUNTER — ROUTINE PRENATAL (OUTPATIENT)
Dept: OBSTETRICS AND GYNECOLOGY | Facility: CLINIC | Age: 29
End: 2024-11-11
Payer: COMMERCIAL

## 2024-11-11 ENCOUNTER — APPOINTMENT (OUTPATIENT)
Dept: OBSTETRICS AND GYNECOLOGY | Facility: CLINIC | Age: 29
End: 2024-11-11
Payer: COMMERCIAL

## 2024-11-11 VITALS — WEIGHT: 246.4 LBS | BODY MASS INDEX: 46.56 KG/M2 | DIASTOLIC BLOOD PRESSURE: 61 MMHG | SYSTOLIC BLOOD PRESSURE: 95 MMHG

## 2024-11-11 DIAGNOSIS — J45.909 ASTHMA AFFECTING PREGNANCY, ANTEPARTUM (HHS-HCC): ICD-10-CM

## 2024-11-11 DIAGNOSIS — O34.219 UTERINE SCAR FROM PREVIOUS CESAREAN DELIVERY AFFECTING PREGNANCY (HHS-HCC): Primary | ICD-10-CM

## 2024-11-11 DIAGNOSIS — O99.519 ASTHMA AFFECTING PREGNANCY, ANTEPARTUM (HHS-HCC): ICD-10-CM

## 2024-11-11 DIAGNOSIS — Z3A.15 15 WEEKS GESTATION OF PREGNANCY (HHS-HCC): ICD-10-CM

## 2024-11-11 DIAGNOSIS — O10.919 CHRONIC HYPERTENSION AFFECTING PREGNANCY (HHS-HCC): ICD-10-CM

## 2024-11-11 DIAGNOSIS — B00.9 HERPES SIMPLEX TYPE 2 (HSV-2) INFECTION AFFECTING PREGNANCY, ANTEPARTUM (HHS-HCC): ICD-10-CM

## 2024-11-11 DIAGNOSIS — Z3A.16 16 WEEKS GESTATION OF PREGNANCY (HHS-HCC): ICD-10-CM

## 2024-11-11 DIAGNOSIS — O98.519 HERPES SIMPLEX TYPE 2 (HSV-2) INFECTION AFFECTING PREGNANCY, ANTEPARTUM (HHS-HCC): ICD-10-CM

## 2024-11-11 PROCEDURE — 99213 OFFICE O/P EST LOW 20 MIN: CPT | Performed by: STUDENT IN AN ORGANIZED HEALTH CARE EDUCATION/TRAINING PROGRAM

## 2024-11-11 PROCEDURE — 99213 OFFICE O/P EST LOW 20 MIN: CPT | Mod: GC,TH | Performed by: STUDENT IN AN ORGANIZED HEALTH CARE EDUCATION/TRAINING PROGRAM

## 2024-11-11 NOTE — PROGRESS NOTES
OB Follow-Up  24     SUBJECTIVE    HPI: Myles Tompkins is a 29 y.o.  at 16w6d here for RPNV.  She has some pain in lower pelvis that radiates to vagina, but no bleeding or LOF.       OBJECTIVE  Visit Vitals  BP 95/61 Comment: 84   Wt 112 kg (246 lb 6.4 oz)   LMP 2024   BMI 46.56 kg/m²   OB Status Pregnant   Smoking Status Never   BSA 2.2 m²        FHR: 148    ASSESSMENT & PLAN    Myles Tompkins is a 29 y.o.  at 16w6d here for the following concerns we addressed today:    Uterine scar from previous  delivery affecting pregnancy ([facility])  - Reviewed with patient the R/B/A of TOLAC vs RCS including:   (1) Successful TOLAC BENEFITS (faster recovery time after birth of infant, avoidance of major abdominal surgery, lower risk of blood loss/infection/blood clots/future pregnancy complications, and decreased breathing difficulty at birth)   (2) Planned REPEAT CD BENEFITS (potential scheduled delivery date, eliminated risks of TOLAC)  (3) Unsuccessful TOLAC RISKS (tear in uterus in 0.5-0.9% women attempting TOLAC and requires an immediate CD and can lead to brain injury or death of the baby, increased risk of bleeding/infection/blood clots/hysterectomy  (4) Planned REPEAT CD BENEFITS (complication risks increase with # of prior CDs, increased risk of blood loss/infection/blood clots/injury to other organs/blood vessels)  - All questions answered.  Pt desires rCS at this time      Herpes simplex type 2 (HSV-2) infection affecting pregnancy, antepartum ([facility])  - no outbreaks since 2019, discussed Valtrex at 36wga    Chronic hypertension affecting pregnancy ([facility])  - discussed increased risks of sPEC, patient expressed understanding, had experience in past pregnancy with aspirin where she got lightheaded and needed to sit down and strongly desires not to take it this pregnancy  - precautions given  - normotensive today    Asthma affecting pregnancy, antepartum ([facility])  -  albuterol prn, takes once or twice a week  - declines flu and covid, discussed increased risks of worse disease given asthma and pregnancy, patient expressed understanding    16 weeks gestation of pregnancy ([facility])  - patient expriencing some round ligament pain, tolerable, discussed pregnancy belt and hot packs, patient expressed understanding and will start using one she has at home  - anatomy US scheduled for 12/6    No orders of the defined types were placed in this encounter.       RTC in 4 weeks      Ellis Hutchins, PGY4  Seen and discussed with Dr. Lemos

## 2024-11-11 NOTE — ASSESSMENT & PLAN NOTE
- discussed increased risks of sPEC, patient expressed understanding, had experience in past pregnancy with aspirin where she got lightheaded and needed to sit down and strongly desires not to take it this pregnancy  - precautions given  - normotensive today

## 2024-11-11 NOTE — ASSESSMENT & PLAN NOTE
- Reviewed with patient the R/B/A of TOLAC vs RCS including:   (1) Successful TOLAC BENEFITS (faster recovery time after birth of infant, avoidance of major abdominal surgery, lower risk of blood loss/infection/blood clots/future pregnancy complications, and decreased breathing difficulty at birth)   (2) Planned REPEAT CD BENEFITS (potential scheduled delivery date, eliminated risks of TOLAC)  (3) Unsuccessful TOLAC RISKS (tear in uterus in 0.5-0.9% women attempting TOLAC and requires an immediate CD and can lead to brain injury or death of the baby, increased risk of bleeding/infection/blood clots/hysterectomy  (4) Planned REPEAT CD BENEFITS (complication risks increase with # of prior CDs, increased risk of blood loss/infection/blood clots/injury to other organs/blood vessels)  - All questions answered.  Pt desires rCS at this time

## 2024-11-11 NOTE — ASSESSMENT & PLAN NOTE
- patient expriencing some round ligament pain, tolerable, discussed pregnancy belt and hot packs, patient expressed understanding and will start using one she has at home  - anatomy US scheduled for 12/6

## 2024-11-11 NOTE — ASSESSMENT & PLAN NOTE
- albuterol prn, takes once or twice a week  - declines flu and covid, discussed increased risks of worse disease given asthma and pregnancy, patient expressed understanding

## 2024-12-02 ENCOUNTER — APPOINTMENT (OUTPATIENT)
Dept: OBSTETRICS AND GYNECOLOGY | Facility: CLINIC | Age: 29
End: 2024-12-02
Payer: COMMERCIAL

## 2024-12-06 ENCOUNTER — APPOINTMENT (OUTPATIENT)
Dept: RADIOLOGY | Facility: HOSPITAL | Age: 29
End: 2024-12-06
Payer: COMMERCIAL

## 2024-12-09 ENCOUNTER — APPOINTMENT (OUTPATIENT)
Dept: OBSTETRICS AND GYNECOLOGY | Facility: CLINIC | Age: 29
End: 2024-12-09
Payer: COMMERCIAL

## 2024-12-10 ENCOUNTER — APPOINTMENT (OUTPATIENT)
Dept: RADIOLOGY | Facility: HOSPITAL | Age: 29
End: 2024-12-10
Payer: COMMERCIAL

## 2024-12-11 ENCOUNTER — HOSPITAL ENCOUNTER (OUTPATIENT)
Dept: RADIOLOGY | Facility: HOSPITAL | Age: 29
Discharge: HOME | End: 2024-12-11
Payer: COMMERCIAL

## 2024-12-11 DIAGNOSIS — Z3A.09 9 WEEKS GESTATION OF PREGNANCY (HHS-HCC): ICD-10-CM

## 2024-12-11 PROCEDURE — 76811 OB US DETAILED SNGL FETUS: CPT | Performed by: STUDENT IN AN ORGANIZED HEALTH CARE EDUCATION/TRAINING PROGRAM

## 2024-12-11 PROCEDURE — 76811 OB US DETAILED SNGL FETUS: CPT

## 2024-12-23 ENCOUNTER — APPOINTMENT (OUTPATIENT)
Dept: OBSTETRICS AND GYNECOLOGY | Facility: CLINIC | Age: 29
End: 2024-12-23
Payer: COMMERCIAL

## 2024-12-30 DIAGNOSIS — J45.909 ASTHMA, UNSPECIFIED ASTHMA SEVERITY, UNSPECIFIED WHETHER COMPLICATED, UNSPECIFIED WHETHER PERSISTENT (HHS-HCC): ICD-10-CM

## 2024-12-30 RX ORDER — ALBUTEROL SULFATE 90 UG/1
1 INHALANT RESPIRATORY (INHALATION) EVERY 6 HOURS PRN
Qty: 18 G | Refills: 11 | Status: SHIPPED | OUTPATIENT
Start: 2024-12-30 | End: 2025-01-29

## 2025-01-03 ENCOUNTER — HOSPITAL ENCOUNTER (OUTPATIENT)
Dept: RADIOLOGY | Facility: HOSPITAL | Age: 30
Discharge: HOME | End: 2025-01-03
Payer: COMMERCIAL

## 2025-01-03 DIAGNOSIS — Z3A.09 9 WEEKS GESTATION OF PREGNANCY (HHS-HCC): ICD-10-CM

## 2025-01-03 PROCEDURE — 76816 OB US FOLLOW-UP PER FETUS: CPT

## 2025-01-08 ENCOUNTER — TELEPHONE (OUTPATIENT)
Dept: OBSTETRICS AND GYNECOLOGY | Facility: HOSPITAL | Age: 30
End: 2025-01-08
Payer: COMMERCIAL

## 2025-01-13 ENCOUNTER — TELEPHONE (OUTPATIENT)
Dept: OBSTETRICS AND GYNECOLOGY | Facility: HOSPITAL | Age: 30
End: 2025-01-13

## 2025-01-13 ENCOUNTER — APPOINTMENT (OUTPATIENT)
Dept: OBSTETRICS AND GYNECOLOGY | Facility: CLINIC | Age: 30
End: 2025-01-13
Payer: COMMERCIAL

## 2025-01-13 NOTE — TELEPHONE ENCOUNTER
Attempted to call pt to schedule fetal echo. Unable to get through on number listed; busy tone. Sent pt Ortho-tagt message asking her to call to set up appointment.

## 2025-01-27 ENCOUNTER — APPOINTMENT (OUTPATIENT)
Dept: OBSTETRICS AND GYNECOLOGY | Facility: CLINIC | Age: 30
End: 2025-01-27
Payer: COMMERCIAL

## 2025-01-29 ENCOUNTER — HOSPITAL ENCOUNTER (OUTPATIENT)
Dept: RADIOLOGY | Facility: HOSPITAL | Age: 30
Discharge: HOME | End: 2025-01-29
Payer: COMMERCIAL

## 2025-01-29 ENCOUNTER — TELEPHONE (OUTPATIENT)
Dept: OBSTETRICS AND GYNECOLOGY | Facility: CLINIC | Age: 30
End: 2025-01-29
Payer: COMMERCIAL

## 2025-01-29 DIAGNOSIS — Z3A.09 9 WEEKS GESTATION OF PREGNANCY (HHS-HCC): ICD-10-CM

## 2025-01-29 PROCEDURE — 76819 FETAL BIOPHYS PROFIL W/O NST: CPT

## 2025-01-29 PROCEDURE — 76816 OB US FOLLOW-UP PER FETUS: CPT | Performed by: STUDENT IN AN ORGANIZED HEALTH CARE EDUCATION/TRAINING PROGRAM

## 2025-01-29 PROCEDURE — 76819 FETAL BIOPHYS PROFIL W/O NST: CPT | Performed by: STUDENT IN AN ORGANIZED HEALTH CARE EDUCATION/TRAINING PROGRAM

## 2025-01-29 PROCEDURE — 76816 OB US FOLLOW-UP PER FETUS: CPT

## 2025-01-29 NOTE — TELEPHONE ENCOUNTER
Pt was called and notified that her fmla forms were completed by  [employer] lpn and faxed to her employer. Copy to scan and originals at  for her to . I noticed pt has not been seen for ob fu since November so pt scheduled for obfu tomorrow.

## 2025-01-30 ENCOUNTER — APPOINTMENT (OUTPATIENT)
Dept: LAB | Facility: HOSPITAL | Age: 30
End: 2025-01-30
Payer: COMMERCIAL

## 2025-01-30 ENCOUNTER — ROUTINE PRENATAL (OUTPATIENT)
Dept: OBSTETRICS AND GYNECOLOGY | Facility: CLINIC | Age: 30
End: 2025-01-30
Payer: COMMERCIAL

## 2025-01-30 VITALS
WEIGHT: 243.7 LBS | HEART RATE: 87 BPM | BODY MASS INDEX: 46.05 KG/M2 | DIASTOLIC BLOOD PRESSURE: 83 MMHG | SYSTOLIC BLOOD PRESSURE: 125 MMHG

## 2025-01-30 DIAGNOSIS — Z3A.28 28 WEEKS GESTATION OF PREGNANCY (HHS-HCC): Primary | ICD-10-CM

## 2025-01-30 DIAGNOSIS — O10.919 CHRONIC HYPERTENSION AFFECTING PREGNANCY (HHS-HCC): ICD-10-CM

## 2025-01-30 DIAGNOSIS — O09.92 HIGH-RISK PREGNANCY IN SECOND TRIMESTER (HHS-HCC): ICD-10-CM

## 2025-01-30 LAB
ERYTHROCYTE [DISTWIDTH] IN BLOOD BY AUTOMATED COUNT: 13.2 % (ref 11.5–14.5)
HCT VFR BLD AUTO: 36 % (ref 36–46)
HGB BLD-MCNC: 11.8 G/DL (ref 12–16)
MCH RBC QN AUTO: 32.9 PG (ref 26–34)
MCHC RBC AUTO-ENTMCNC: 32.8 G/DL (ref 32–36)
MCV RBC AUTO: 100 FL (ref 80–100)
NRBC BLD-RTO: 0 /100 WBCS (ref 0–0)
PLATELET # BLD AUTO: 329 X10*3/UL (ref 150–450)
RBC # BLD AUTO: 3.59 X10*6/UL (ref 4–5.2)
REFLEX ADDED, ANEMIA PANEL: NORMAL
WBC # BLD AUTO: 9.3 X10*3/UL (ref 4.4–11.3)

## 2025-01-30 PROCEDURE — 90715 TDAP VACCINE 7 YRS/> IM: CPT | Mod: GC

## 2025-01-30 PROCEDURE — 85027 COMPLETE CBC AUTOMATED: CPT

## 2025-01-30 PROCEDURE — 99214 OFFICE O/P EST MOD 30 MIN: CPT | Mod: GC,25,TH

## 2025-01-30 PROCEDURE — 36415 COLL VENOUS BLD VENIPUNCTURE: CPT

## 2025-01-30 ASSESSMENT — ENCOUNTER SYMPTOMS
HEMATOLOGIC/LYMPHATIC NEGATIVE: 0
EYES NEGATIVE: 0
NEUROLOGICAL NEGATIVE: 0
ENDOCRINE NEGATIVE: 0
ALLERGIC/IMMUNOLOGIC NEGATIVE: 0
GASTROINTESTINAL NEGATIVE: 0
CONSTITUTIONAL NEGATIVE: 0
RESPIRATORY NEGATIVE: 0
MUSCULOSKELETAL NEGATIVE: 0
PSYCHIATRIC NEGATIVE: 0
CARDIOVASCULAR NEGATIVE: 0

## 2025-01-30 ASSESSMENT — PATIENT HEALTH QUESTIONNAIRE - PHQ9
SUM OF ALL RESPONSES TO PHQ9 QUESTIONS 1 AND 2: 0
1. LITTLE INTEREST OR PLEASURE IN DOING THINGS: NOT AT ALL
2. FEELING DOWN, DEPRESSED OR HOPELESS: NOT AT ALL

## 2025-01-30 ASSESSMENT — PAIN SCALES - GENERAL: PAINLEVEL_OUTOF10: 0 - NO PAIN

## 2025-01-30 ASSESSMENT — PAIN - FUNCTIONAL ASSESSMENT: PAIN_FUNCTIONAL_ASSESSMENT: 0-10

## 2025-01-30 NOTE — ASSESSMENT & PLAN NOTE
Discussed PEC precautions   Discussed recommendation to check BPs, patient declining blood pressure cuff

## 2025-01-30 NOTE — PROGRESS NOTES
OB Follow-up    SUBJECTIVE    HPI: Myles Tompkins is a 29 y.o.  at 28w2d here for RPNV. Denies contractions, bleeding, or LOF. Reports normal fetal movement. Patient reports feeling tired but she reports getting enough sleep. Denies recent use of albuterol inhaler. Denies recent genital HSV lesions.     OBJECTIVE  Visit Vitals  /83   Pulse 87   Wt 111 kg (243 lb 11.2 oz)   LMP 2024   BMI 46.05 kg/m²   OB Status Pregnant   Smoking Status Never   BSA 2.19 m²      FHT: 159 bpm     ASSESSMENT & PLAN    Myles Tompkins is a 29 y.o.  at 28w2d here for the following concerns we addressed today:    Chronic hypertension affecting pregnancy ([facility])  Discussed PEC precautions   Discussed recommendation to check BPs, patient declining blood pressure cuff    28 weeks gestation of pregnancy ([facility])  Tdap administered on   1 hr gtt, CBC and Syphilis screening ordered, reqs provided to patient   PPBC: considering PPTL, consent to be signed at next visit   [place] feeding: formula    Uterine scar from previous  delivery affecting pregnancy ([facility])  Desires repeat         Orders Placed This Encounter   Procedures    Tdap vaccine, age 7 years and older  ([employer])    Syphilis Screen with Reflex     Standing Status:   Future     Number of Occurrences:   1     Standing Expiration Date:   2026     Order Specific Question:   Release result to MyChart     Answer:   Immediate [1]    Glucose, 1 Hour Screen, Pregnancy     Standing Status:   Future     Number of Occurrences:   1     Standing Expiration Date:   2026     Order Specific Question:   Release result to MyChart     Answer:   Immediate [1]    CBC Anemia Panel With Reflex,Pregnancy     Standing Status:   Future     Number of Occurrences:   1     Standing Expiration Date:   2026     Order Specific Question:   Release result to MyChart     Answer:   Immediate [1]     Order Specific Question:   [facility]?      Answer:   [facility]: SEND TO [facility]      RTC in 2 weeks  D/w Dr. Mikey Gonzalez MD, PGY-3

## 2025-01-30 NOTE — PROGRESS NOTES
I saw and evaluated the patient. I personally obtained the key and critical portions of the history and physical exam or was physically present for key and critical portions performed by the resident/fellow. I reviewed the resident/fellow's documentation and discussed the patient with the resident/fellow. I agree with the resident/fellow's medical decision making as documented in the note.    Valeria Jensen MD

## 2025-01-30 NOTE — ASSESSMENT & PLAN NOTE
Tdap administered on   1 hr gtt, CBC and Syphilis screening ordered, reqs provided to patient   PPBC: considering PPTL, consent to be signed at next visit    feeding: formula

## 2025-02-02 LAB
GLUCOSE 1H P 50 G GLC PO SERPL-MCNC: 99 MG/DL
T PALLIDUM AB SER QL IA: NEGATIVE

## 2025-02-03 ENCOUNTER — HOSPITAL ENCOUNTER (OUTPATIENT)
Dept: PEDIATRIC CARDIOLOGY | Facility: HOSPITAL | Age: 30
Discharge: HOME | End: 2025-02-03
Payer: COMMERCIAL

## 2025-02-03 ENCOUNTER — OFFICE VISIT (OUTPATIENT)
Dept: PEDIATRIC CARDIOLOGY | Facility: HOSPITAL | Age: 30
End: 2025-02-03
Payer: COMMERCIAL

## 2025-02-03 VITALS
WEIGHT: 242.73 LBS | HEIGHT: 61 IN | DIASTOLIC BLOOD PRESSURE: 76 MMHG | OXYGEN SATURATION: 97 % | SYSTOLIC BLOOD PRESSURE: 111 MMHG | HEART RATE: 102 BPM | BODY MASS INDEX: 45.83 KG/M2

## 2025-02-03 DIAGNOSIS — O35.8XX0 MATERNAL CARE FOR OTHER (SUSPECTED) FETAL ABNORMALITY AND DAMAGE, NOT APPLICABLE OR UNSPECIFIED (HHS-HCC): ICD-10-CM

## 2025-02-03 DIAGNOSIS — O35.9XX0 SUSPECTED FETAL ABNORMALITY AFFECTING MANAGEMENT OF MOTHER, SINGLE OR UNSPECIFIED FETUS (HHS-HCC): Primary | ICD-10-CM

## 2025-02-03 DIAGNOSIS — O35.9XX1 SUSPECTED FETAL ANOMALY, ANTEPARTUM, FETUS 1 (HHS-HCC): ICD-10-CM

## 2025-02-03 PROCEDURE — 3078F DIAST BP <80 MM HG: CPT | Performed by: PEDIATRICS

## 2025-02-03 PROCEDURE — 76825 ECHO EXAM OF FETAL HEART: CPT | Performed by: PEDIATRICS

## 2025-02-03 PROCEDURE — 76827 ECHO EXAM OF FETAL HEART: CPT | Performed by: PEDIATRICS

## 2025-02-03 PROCEDURE — 3008F BODY MASS INDEX DOCD: CPT | Performed by: PEDIATRICS

## 2025-02-03 PROCEDURE — 1036F TOBACCO NON-USER: CPT | Performed by: PEDIATRICS

## 2025-02-03 PROCEDURE — 99214 OFFICE O/P EST MOD 30 MIN: CPT | Mod: 25 | Performed by: PEDIATRICS

## 2025-02-03 PROCEDURE — 99244 OFF/OP CNSLTJ NEW/EST MOD 40: CPT | Performed by: PEDIATRICS

## 2025-02-03 PROCEDURE — 93325 DOPPLER ECHO COLOR FLOW MAPG: CPT | Performed by: PEDIATRICS

## 2025-02-03 PROCEDURE — 3074F SYST BP LT 130 MM HG: CPT | Performed by: PEDIATRICS

## 2025-02-03 PROCEDURE — 76825 ECHO EXAM OF FETAL HEART: CPT

## 2025-02-03 NOTE — PROGRESS NOTES
[facility]  [facility]  Division of Pediatric Cardiology  [facility]  [address], 1st Floor, [place] [address]  Tel: [phone], Fax [phone]      Obstetrician: Cori Pappas CNM    Myles Tompkins was seen at the request of Dr. Alexandr Owens for incomplete cardiac views on obstetric ultrasound.  Records were reviewed, and a summary of those records is integrated within the history of present illness.  A report with my findings is being sent via written or electronic means to the referring physician with my recommendations.    History obtained from: patient    History of Presentation   History of Present Illness:   Myles Tompkins is a 29 y.o. female presenting for initial prenatal cardiology consultation and fetal echocardiogram for incomplete cardiac views on obstetric ultrasound.  She is  and approximately 28w6d weeks pregnant (Patient's last menstrual period was 2024., Estimated Date of Delivery: 2025) with a Female fetus.  Her obstetric history is significant for one term delivery via  section.  Complications of her current pregnancy include obesity and hypertension (BMI 46).  Ms. Myles Tompkins had a normal first trimester screen.  Her level 2 obstetric ultrasound for anatomy was performed at 21 weeks gestational age and was incomplete due to suboptimal cardiac views. Repeat obstetric ultrasounds at both 24 and 28 weeks continued to demonstrate incomplete cardiac views, but were otherwise normal.  She has undergone cell free fetal DNA testing and it was normal.  She has not had invasive genetic testing such as amniocentesis or chorionic villous sampling.  This pregnancy was not the result of in vitro fertilization.  She was not using potentially teratogenic medication at the time of conception.  There have been no  other complications of this pregnancy.  Ms. Myles Tompkins plans to deliver her baby at [facility].    Ms. Myles Tompkins feels well today.  She denies any shortness of breath, abdominal cramping, contractions, bleeding, or swelling of the extremities.  She notes frequent fetal movement.      Medical History     Medical Conditions:  Patient Active Problem List   Diagnosis    Asthma affecting pregnancy, antepartum ([facility])    Food insecurity    Chronic hypertension affecting pregnancy ([facility])    Obesity in pregnancy ([facility])    Uterine scar from previous  delivery affecting pregnancy ([facility])    Previous baby with fetal growth restriction    Herpes simplex type 2 (HSV-2) infection affecting pregnancy, antepartum ([facility])    28 weeks gestation of pregnancy ([facility])     Past Surgeries:  Past Surgical History:   Procedure Laterality Date     SECTION, LOW TRANSVERSE       Current Outpatient Medications   Medication Instructions    albuterol (Ventolin HFA) 90 mcg/actuation inhaler 1 puff, inhalation, Every 6 hours PRN    mometasone (Asmanex Twisthaler) 220 mcg/ actuation (60)i inhaler 2 puffs, inhalation, Daily    prenatal vitamin, iron-folic, 27 mg iron-800 mcg folic acid tablet 1 tablet, oral, Daily      Allergies:  Bee pollen    Social History:  Patient lives with her son.  Occupation: Environmental services   Smoking: None  Alcohol: None  Drug Use: None  She wears a seatbelt while in the car. She denies any verbal, sexual or physical abuse.     Cardiac Family History (for patient and father of baby):  There is no history of congenital heart disease.  There is no history of early sudden/unexplained death including SIDS and drowning.  There is no history of cardiomyopathy of any type or heart transplant.  There is no history of arrhythmias/pacemaker/defibrillator or arrhythmia syndromes, including Long QT syndrome, Mu-Parkinson-White syndrome or Brugada syndrome.   "There is no history of heart attack or stroke before the age of 55 years in a close family member.  There is no history of Marfan syndrome or aortic aneurysm.  There is no history of deafness.  There is no history of syncope/fainting.  There is no history of high blood pressure or high cholesterol.  There is no history of DiGeorge Syndrome (22q11).    Physical Examination     /76 (BP Location: Right arm, Patient Position: Sitting, BP Cuff Size: Large adult long)   Pulse 102   Ht 1.549 m (5' 1\")   Wt 110 kg (242 lb 11.6 oz)   LMP 07/16/2024   SpO2 97%   BMI 45.86 kg/m²     General: Alert, well-appearing and in no acute distress.    Abdomen: Soft, nontender, not distended. Gravid.  Extremities: No swelling or edema.  Neurologic / Psychiatric: Grossly intact without focal deficits.  Appropriate demeanor.      Results     Fetal Echocardiogram:    I ordered and interpreted a transabdominal fetal echocardiogram.  I performed a portion of the study myself.  The complete report is available under separate cover.  The results are summarized as follows:    Image quality: Good  Cardiac situs: Cardiac mass in the left chest.  Left ventricular apex points leftward.  Segmental anatomy: Atrio-ventricular concordance.  Ventriculo-arterial concordance.  Normally-related great arteries.  Superior vena cava: Normal connection to the right atrium.  Inferior vena cava: Normal connection to the right atrium.  Pulmonary veins: At least one pulmonary vein connects normally to the left atrium.  Atrial septum: Patent foramen ovale, with flap valve bowing into the left atrium.  Tricuspid valve: Structurally normal.  No obvious stenosis or insufficiency.  Mitral valve: Structurally normal.  No obvious stenosis or insufficiency.  Right ventricle: Normal ventricular size, wall thickness, and systolic function (qualitative).  Left ventricle: Normal ventricular size, wall thickness, and systolic function " (qualitative).  Interventricular septum: No obvious septal defect.  Aortic valve: Structurally normal.  No obvious stenosis or insufficiency.  Pulmonary valve: Structurally normal.  No obvious stenosis or insufficiency.  Pulmonary artery: Normal in size.  Ductal arch: Patent with right to left shunting.  Aortic arch: Left-sided.  Patent.  Pericardial effusion: None.  Umbilical arteries: Two umbilical arteries.  Normal arterial flow pattern.  Umbilical vein: Normal venous flow pattern.  Electrophysiology: Normal fetal heart rate and rhythm.  Normal mechanical CA interval.      Assessment & Plan   Assessment:  Ms. Myles Tompkins is a 29 y.o. female who is  and currently at 28w6d weeks gestational age with a female fetus.  A fetal echocardiogram was performed today for incomplete cardiac views on obstetric ultrasound.     Fetal echocardiogram today demonstrated grossly normal fetal cardiac anatomy, qualitatively normal fetal cardiac function, normal fetal heart rhythm, and no evidence of in utero congestive heart failure or hydrops fetalis.  I reviewed the results of today's evaluation, including the findings of the fetal echocardiogram, with Ms. Myles Tompkins in detail.      I discussed limitations of the technology of fetal echocardiography with Ms. Myles Tompkins in detail.  I explained that fetal echocardiography cannot exclude all forms of congenital heart disease.  Fetal echocardiography may be insensitive to some defects of atrial and ventricular septation, minor valvular abnormalities, partial anomalous pulmonary venous return, and coarctation of the aorta.  In addition, normal fetal echocardiogram does not ensure that the fetal ductus arteriosus or foramen ovale will close.      Recommendations:  No changes to prenatal care.    Further fetal cardiac imaging is not required at this time.  We would be happy to see Ms. Myles Tompkins in the future if new concerns arise.    Triage  code 0:        No changes to delivery planning.  Delivery per obstetrics at patient´s preferred hospital.  Standard  care per  team.        No pediatric cardiology consult needed after birth unless there are concerns/issues.    I spent greater than 60 minutes in performance of this consultation, of which greater than 50% was related to coordination of care or counseling.    This assessment and plan, in addition to the results of relevant testing were explained to Myles. All questions were answered and understanding was demonstrated.    It was a pleasure to see Ms. Myles [place] Turner today.  If you have any questions or concerns regarding this evaluation, do not hesitate to contact me.      Laura Mckeon MD, FACC, FAAP   of Pediatrics  Division of Pediatric Cardiology  [facility] and [place] [address]  Phone: [phone]  Fax: [phone]  e-mail: [email or web address]

## 2025-02-03 NOTE — PATIENT INSTRUCTIONS
On fetal echocardiogram today the structure, size, function (squeeze), and rhythm of your fetus' heart were normal.  There are some limitations to fetal echocardiogram as described below, and because it is not a perfect test it is important to know that we cannot rule out all possible heart problems (see below).  We will communicate these results with your obstetrician.    It was a pleasure to see you today.  We do not need to see you back for routine follow-up during the remainder of your pregnancy.  However, we would be happy to see you back if new issues or concerns arise.  After birth your baby does not need to see a cardiologist unless the pediatric team has concerns.  If you have any questions or concerns regarding this evaluation, please do not hesitate to contact me.    Laura Mckeon MD   of Pediatrics  Division of Pediatric Cardiology  [place] [address]  Phone: [phone]  Fax: [phone]  e-mail: [email or web address]    [email or web address]    Normal Fetal Echocardiogram    Today you had an ultrasound of your fetus' heart (fetal echocardiogram).  Based on all of the pictures taken today, the heart appears normal and is developing as expected for this point in your pregnancy.   Therefore, no further testing is recommended at this time.    Despite today´s normal findings, it is impossible to rule out all heart problems before birth.  This is partially because the fetus´ heart is so small, and our machine´s technology can only see structures down to a certain size.  It is also because blood flow in a fetus is different and more complicated than blood flow after birth.    Briefly:  ° Fetuses get oxygen from the placenta instead of their lungs.  High-oxygen (red) blood from the placenta comes back to the right side of the fetus´  heart.  ° Red blood crosses to the left side of the heart through a hole between the upper chambers of the heart, the foramen ovale.  The foramen ovale lets the red blood flow to the body.  ° Low-oxygen (blue) blood stays on the right side of the heart.  After leaving the heart, the blue blood flows through a special blood vessel known as the ductus arteriosus.  The ductus arteriosus allows the blue blood to bypass the lungs and return to the placenta to get oxygen.  ° After birth the foramen ovale and ductus arteriosus should close, but sometimes they do not.  It is impossible to predict in which children these structures will remain open.    ° Thus, on fetal echo we cannot rule out that your baby will have a patent foramen ovale (PFO) or patent ductus arteriosus (PDA) after birth.    In addition, fetal echocardiography can miss other heart defects including:  ° Small or medium-sized holes between the upper or lower heart chambers.  ° Minor abnormalities of the heart valves.  ° Narrowing of the main artery that goes to the body (coarctation of the aorta).  ° Other rare abnormalities of the veins or arteries.    If any of these defects are present, there should be findings after birth that will alert your child´s doctor that there is a problem and prompt further evaluation.  After delivery, if your pediatrician has any concerns, your child's heart can be reevaluated at that time.

## 2025-02-03 NOTE — LETTER
February 3, 2025     Alexandr Owens MD  [address] [place] [address]    Patient: Myles Tompkins   YOB: 1995   Date of Visit: 2/3/2025       Dear Dr. Alexandr Owens MD:    Thank you for referring Myles Tompkins to me for evaluation. Below are my notes for this consultation.  If you have questions, please do not hesitate to call me. I look forward to following your patient along with you.       Sincerely,     Laura Mckeon MD      CC: ROCKY Lawson, CELIA-CNP  ROCKY Valentin, DNP  ______________________________________________________________________________________         [facility]  [facility]  Division of Pediatric Cardiology  [facility]  [address] [place] [address], [address] [place] [address]  Tel: [phone], Fax [phone]      Obstetrician: Cori Pappas CNM    Myles Tompkins was seen at the request of Dr. Alexandr Owens for incomplete cardiac views on obstetric ultrasound.  Records were reviewed, and a summary of those records is integrated within the history of present illness.  A report with my findings is being sent via written or electronic means to the referring physician with my recommendations.    History obtained from: patient    History of Presentation   History of Present Illness:   Myles Tompkins is a 29 y.o. female presenting for initial prenatal cardiology consultation and fetal echocardiogram for incomplete cardiac views on obstetric ultrasound.  She is  and approximately 28w6d weeks pregnant (Patient's last menstrual period was 2024., Estimated Date of Delivery: 2025) with a Female fetus.  Her obstetric history is significant for one term delivery via  section.  Complications of her current pregnancy include obesity and hypertension (BMI 46).  Ms.  Myles Tompkins had a normal first trimester screen.  Her level 2 obstetric ultrasound for anatomy was performed at 21 weeks gestational age and was incomplete due to suboptimal cardiac views. Repeat obstetric ultrasounds at both 24 and 28 weeks continued to demonstrate incomplete cardiac views, but were otherwise normal.  She has undergone cell free fetal DNA testing and it was normal.  She has not had invasive genetic testing such as amniocentesis or chorionic villous sampling.  This pregnancy was not the result of in vitro fertilization.  She was not using potentially teratogenic medication at the time of conception.  There have been no other complications of this pregnancy.  Ms. Myles Tompkins plans to deliver her baby at [facility].    Ms. Myles Tompkins feels well today.  She denies any shortness of breath, abdominal cramping, contractions, bleeding, or swelling of the extremities.  She notes frequent fetal movement.      Medical History     Medical Conditions:  Patient Active Problem List   Diagnosis   • Asthma affecting pregnancy, antepartum ([facility])   • Food insecurity   • Chronic hypertension affecting pregnancy ([facility])   • Obesity in pregnancy ([facility])   • Uterine scar from previous  delivery affecting pregnancy ([facility])   • Previous baby with fetal growth restriction   • Herpes simplex type 2 (HSV-2) infection affecting pregnancy, antepartum ([facility])   • 28 weeks gestation of pregnancy ([facility])     Past Surgeries:  Past Surgical History:   Procedure Laterality Date   •  SECTION, LOW TRANSVERSE       Current Outpatient Medications   Medication Instructions   • albuterol (Ventolin HFA) 90 mcg/actuation inhaler 1 puff, inhalation, Every 6 hours PRN   • mometasone (Asmanex Twisthaler) 220 mcg/ actuation (60)i inhaler 2 puffs, inhalation, Daily   • prenatal vitamin, iron-folic, 27 mg iron-800 mcg folic acid tablet 1 tablet, oral, Daily     "  Allergies:  Bee pollen    Social History:  Patient lives with her son.  Occupation: Environmental services   Smoking: None  Alcohol: None  Drug Use: None  She wears a seatbelt while in the car. She denies any verbal, sexual or physical abuse.     Cardiac Family History (for patient and father of baby):  There is no history of congenital heart disease.  There is no history of early sudden/unexplained death including SIDS and drowning.  There is no history of cardiomyopathy of any type or heart transplant.  There is no history of arrhythmias/pacemaker/defibrillator or arrhythmia syndromes, including Long QT syndrome, Mu-Parkinson-White syndrome or Brugada syndrome.  There is no history of heart attack or stroke before the age of 55 years in a close family member.  There is no history of Marfan syndrome or aortic aneurysm.  There is no history of deafness.  There is no history of syncope/fainting.  There is no history of high blood pressure or high cholesterol.  There is no history of DiGeorge Syndrome (22q11).    Physical Examination     /76 (BP Location: Right arm, Patient Position: Sitting, BP Cuff Size: Large adult long)   Pulse 102   Ht 1.549 m (5' 1\")   Wt 110 kg (242 lb 11.6 oz)   LMP 07/16/2024   SpO2 97%   BMI 45.86 kg/m²     General: Alert, well-appearing and in no acute distress.    Abdomen: Soft, nontender, not distended. Gravid.  Extremities: No swelling or edema.  Neurologic / Psychiatric: Grossly intact without focal deficits.  Appropriate demeanor.      Results     Fetal Echocardiogram:    I ordered and interpreted a transabdominal fetal echocardiogram.  I performed a portion of the study myself.  The complete report is available under separate cover.  The results are summarized as follows:    Image quality: Good  Cardiac situs: Cardiac mass in the left chest.  Left ventricular apex points leftward.  Segmental anatomy: Atrio-ventricular concordance.  Ventriculo-arterial concordance.  " Normally-related great arteries.  Superior vena cava: Normal connection to the right atrium.  Inferior vena cava: Normal connection to the right atrium.  Pulmonary veins: At least one pulmonary vein connects normally to the left atrium.  Atrial septum: Patent foramen ovale, with flap valve bowing into the left atrium.  Tricuspid valve: Structurally normal.  No obvious stenosis or insufficiency.  Mitral valve: Structurally normal.  No obvious stenosis or insufficiency.  Right ventricle: Normal ventricular size, wall thickness, and systolic function (qualitative).  Left ventricle: Normal ventricular size, wall thickness, and systolic function (qualitative).  Interventricular septum: No obvious septal defect.  Aortic valve: Structurally normal.  No obvious stenosis or insufficiency.  Pulmonary valve: Structurally normal.  No obvious stenosis or insufficiency.  Pulmonary artery: Normal in size.  Ductal arch: Patent with right to left shunting.  Aortic arch: Left-sided.  Patent.  Pericardial effusion: None.  Umbilical arteries: Two umbilical arteries.  Normal arterial flow pattern.  Umbilical vein: Normal venous flow pattern.  Electrophysiology: Normal fetal heart rate and rhythm.  Normal mechanical MI interval.      Assessment & Plan   Assessment:  Ms. Myles Tompkins is a 29 y.o. female who is  and currently at 28w6d weeks gestational age with a female fetus.  A fetal echocardiogram was performed today for incomplete cardiac views on obstetric ultrasound.     Fetal echocardiogram today demonstrated grossly normal fetal cardiac anatomy, qualitatively normal fetal cardiac function, normal fetal heart rhythm, and no evidence of in utero congestive heart failure or hydrops fetalis.  I reviewed the results of today's evaluation, including the findings of the fetal echocardiogram, with Ms. Myles Tompkins in detail.      I discussed limitations of the technology of fetal echocardiography with Ms. Bueno  Merlyn Tompkins in detail.  I explained that fetal echocardiography cannot exclude all forms of congenital heart disease.  Fetal echocardiography may be insensitive to some defects of atrial and ventricular septation, minor valvular abnormalities, partial anomalous pulmonary venous return, and coarctation of the aorta.  In addition, normal fetal echocardiogram does not ensure that the fetal ductus arteriosus or foramen ovale will close.      Recommendations:  No changes to prenatal care.    Further fetal cardiac imaging is not required at this time.  We would be happy to see Ms. Myles Tompkins in the future if new concerns arise.    Triage code 0:        No changes to delivery planning.  Delivery per obstetrics at patient´s preferred hospital.  Standard  care per  team.        No pediatric cardiology consult needed after birth unless there are concerns/issues.    I spent greater than 60 minutes in performance of this consultation, of which greater than 50% was related to coordination of care or counseling.    This assessment and plan, in addition to the results of relevant testing were explained to Myles. All questions were answered and understanding was demonstrated.    It was a pleasure to see Ms. Myles Tompkins today.  If you have any questions or concerns regarding this evaluation, do not hesitate to contact me.      Laura Mckeon MD, FACC, FAAP   of Pediatrics  Division of Pediatric Cardiology  [facility] and [place] [address]  Phone: [phone]  Fax: [phone]  e-mail: [email or web address]

## 2025-02-08 ASSESSMENT — ENCOUNTER SYMPTOMS
BACK PAIN: 1
BACK PAIN: 1

## 2025-02-10 ENCOUNTER — ROUTINE PRENATAL (OUTPATIENT)
Dept: OBSTETRICS AND GYNECOLOGY | Facility: CLINIC | Age: 30
End: 2025-02-10
Payer: COMMERCIAL

## 2025-02-10 ENCOUNTER — APPOINTMENT (OUTPATIENT)
Dept: OBSTETRICS AND GYNECOLOGY | Facility: CLINIC | Age: 30
End: 2025-02-10
Payer: COMMERCIAL

## 2025-02-10 VITALS — DIASTOLIC BLOOD PRESSURE: 79 MMHG | SYSTOLIC BLOOD PRESSURE: 121 MMHG | BODY MASS INDEX: 45.93 KG/M2 | WEIGHT: 243.1 LBS

## 2025-02-10 VITALS — WEIGHT: 243 LBS | DIASTOLIC BLOOD PRESSURE: 78 MMHG | BODY MASS INDEX: 45.91 KG/M2 | SYSTOLIC BLOOD PRESSURE: 118 MMHG

## 2025-02-10 DIAGNOSIS — Z3A.29 29 WEEKS GESTATION OF PREGNANCY (HHS-HCC): Primary | ICD-10-CM

## 2025-02-10 DIAGNOSIS — O10.919 CHRONIC HYPERTENSION AFFECTING PREGNANCY (HHS-HCC): ICD-10-CM

## 2025-02-10 ASSESSMENT — ENCOUNTER SYMPTOMS
NEUROLOGICAL NEGATIVE: 0
MUSCULOSKELETAL NEGATIVE: 0
ENDOCRINE NEGATIVE: 0
HEMATOLOGIC/LYMPHATIC NEGATIVE: 0
GASTROINTESTINAL NEGATIVE: 0
EYES NEGATIVE: 0
RESPIRATORY NEGATIVE: 0
CONSTITUTIONAL NEGATIVE: 0
PSYCHIATRIC NEGATIVE: 0
CARDIOVASCULAR NEGATIVE: 0
ALLERGIC/IMMUNOLOGIC NEGATIVE: 0

## 2025-02-10 ASSESSMENT — EDINBURGH POSTNATAL DEPRESSION SCALE (EPDS)
I HAVE BLAMED MYSELF UNNECESSARILY WHEN THINGS WENT WRONG: NO, NEVER
I HAVE FELT SCARED OR PANICKY FOR NO GOOD REASON: NO, NOT AT ALL
TOTAL SCORE: 3
I HAVE BEEN SO UNHAPPY THAT I HAVE BEEN CRYING: NO, NEVER
I HAVE BEEN ANXIOUS OR WORRIED FOR NO GOOD REASON: YES, VERY OFTEN
I HAVE BEEN SO UNHAPPY THAT I HAVE HAD DIFFICULTY SLEEPING: NOT AT ALL
I HAVE FELT SAD OR MISERABLE: NO, NOT AT ALL
THE THOUGHT OF HARMING MYSELF HAS OCCURRED TO ME: NEVER
I HAVE BEEN ABLE TO LAUGH AND SEE THE FUNNY SIDE OF THINGS: AS MUCH AS I ALWAYS COULD
THINGS HAVE BEEN GETTING ON TOP OF ME: NO, I HAVE BEEN COPING AS WELL AS EVER
I HAVE LOOKED FORWARD WITH ENJOYMENT TO THINGS: AS MUCH AS I EVER DID

## 2025-02-10 NOTE — ASSESSMENT & PLAN NOTE
Briefly discussed PPBC options. Options narrowed down to POPs vs IUD vs Nexplanon. Will continue discussion   Reiterated that fetus is extremely  and, at this time, delivery not indicated until 39.0 wga

## 2025-02-10 NOTE — PROGRESS NOTES
Subjective   Myles Tompkins is a 29 y.o.  at 29w6d with a working estimated date of delivery of 2025, by Last Menstrual Period who presents for Centering visit #8.     Patient had routine prenatal visit with Dr. Gonzalez today, but desired to attend Centering for [facility].     Postpartum Depression: Low Risk  (2/10/2025)    [place]  Depression Scale     Last EPDS Total Score: 3     Last EPDS Self Harm Result: Never       Objective   Physical Exam:   Weight: 110 kg (243 lb)  Expected Total Weight Gain: 5 kg (11 lb)-9 kg (19 lb)   Pregravid BMI: 45.75  BP: 118/78      Problem List Items Addressed This Visit          Pregnancy    29 weeks gestation of pregnancy ([facility]) - Primary    Overview     Desired provider in labor: [] CNM  [] Physician  [x] Blood Products: [x] Yes, accepts [] No, needs counseling  [x] Initial BMI: 45.75   [x] Prenatal Labs: WNL  [x] Cervical Cancer Screening up to date: 24   [x] Rh status: O+  [x] Genetic Screening:  declined   [x] NT US: (11-13 wks): WNL  [x] Baby ASA (if indicated): patient declines  [x] Pregnancy dated by: LMP c/w 13w US    [x] Anatomy US (19-20 wks): fetal cardiac views not completed, fetal echo scheduled for /3  [] Federal Sterilization consent signed (if indicated):  [x] 1hr GCT at 24-28wks: wnl   [x] Fetal Surveillance (if indicated): Growth US at 28/32/36 weeks in setting of prior FGR; BPP/NST weekly starting at 34 weeks  [x] Tdap (27-32 wks, may be given up to 36 wks if initial window missed): administered on   [x] Flu Vaccine: Oct 2024 per patient     [] Breastfeeding:  [] Postpartum Birth control method:   [] GBS at 36 - 37 wks:  [] 39 weeks discussion of IOL vs. Expectant management:   [x] Mode of delivery ( anticipated ): [facility]            Centering Sessions #8 Plan:   -The following educational material was reviewed:  Pregnancy to Parenting transition  Emotional adjustments, baby blues vs. PP depression  -Reviewed s/sx of PTL,  warning signs, fetal movement counts, and when to call provider  -Follow up in 2 weeks for next Centering visit or prn     Centering Visit total time 120min    Laura Doe, APRN-CITLALIM, APRN-CNP

## 2025-02-10 NOTE — PROGRESS NOTES
OB Follow-up  02/10/25    SUBJECTIVE    HPI: Myles Tompkins is a 29 y.o.  at 29w6d here for RPNV. Denies contractions, bleeding, or LOF. Reports normal fetal movement. Patient reports feeling tired still but sleeping okay. Denies HA, scotomas, blurry vision, CP, SOB and RUQ pain. Wondering when she can be delivered.     Other pregnancy complications include:  Patient Active Problem List   Diagnosis    Asthma affecting pregnancy, antepartum ([facility])    Food insecurity    Chronic hypertension affecting pregnancy ([facility])    Obesity in pregnancy ([facility])    Uterine scar from previous  delivery affecting pregnancy ([facility])    Previous baby with fetal growth restriction    Herpes simplex type 2 (HSV-2) infection affecting pregnancy, antepartum ([facility])    29 weeks gestation of pregnancy ([facility])    Suspected fetal abnormality affecting management of mother ([facility])     OBJECTIVE  Visit Vitals  /79 Comment: pluse-91   Wt 110 kg (243 lb 1.6 oz)   LMP 2024   BMI 45.93 kg/m²   OB Status Pregnant   Smoking Status Never   BSA 2.18 m²      FHT: 152 bpm     ASSESSMENT & PLAN    Myles Tompkins is a 29 y.o.  at 29w6d here for the following concerns we addressed today:    Chronic hypertension affecting pregnancy ([facility])  Continues to decline BP cuff  PEC return precautions reviewed     29 weeks gestation of pregnancy ([facility])  Briefly discussed PPBC options. Options narrowed down to POPs vs IUD vs Nexplanon. Will continue discussion   Reiterated that fetus is extremely  and, at this time, delivery not indicated until 39.0 wga      RTC in 2 weeks  Patient seen and evaluated with Dr. Aminta Gonzalez MD, PGY-3

## 2025-02-19 ENCOUNTER — APPOINTMENT (OUTPATIENT)
Dept: OBSTETRICS AND GYNECOLOGY | Facility: HOSPITAL | Age: 30
End: 2025-02-19
Payer: COMMERCIAL

## 2025-02-24 ENCOUNTER — APPOINTMENT (OUTPATIENT)
Dept: OBSTETRICS AND GYNECOLOGY | Facility: CLINIC | Age: 30
End: 2025-02-24
Payer: COMMERCIAL

## 2025-02-24 ENCOUNTER — ROUTINE PRENATAL (OUTPATIENT)
Dept: OBSTETRICS AND GYNECOLOGY | Facility: CLINIC | Age: 30
End: 2025-02-24
Payer: COMMERCIAL

## 2025-02-24 VITALS — SYSTOLIC BLOOD PRESSURE: 118 MMHG | BODY MASS INDEX: 46.84 KG/M2 | DIASTOLIC BLOOD PRESSURE: 79 MMHG | WEIGHT: 247.9 LBS

## 2025-02-24 DIAGNOSIS — Z3A.31 31 WEEKS GESTATION OF PREGNANCY (HHS-HCC): Primary | ICD-10-CM

## 2025-02-24 DIAGNOSIS — Z87.59 PREVIOUS BABY WITH FETAL GROWTH RESTRICTION: ICD-10-CM

## 2025-02-24 DIAGNOSIS — B00.9 HERPES SIMPLEX TYPE 2 (HSV-2) INFECTION AFFECTING PREGNANCY, ANTEPARTUM (HHS-HCC): ICD-10-CM

## 2025-02-24 DIAGNOSIS — O98.519 HERPES SIMPLEX TYPE 2 (HSV-2) INFECTION AFFECTING PREGNANCY, ANTEPARTUM (HHS-HCC): ICD-10-CM

## 2025-02-24 DIAGNOSIS — O10.919 CHRONIC HYPERTENSION AFFECTING PREGNANCY (HHS-HCC): Primary | ICD-10-CM

## 2025-02-24 DIAGNOSIS — Z3A.31 31 WEEKS GESTATION OF PREGNANCY (HHS-HCC): ICD-10-CM

## 2025-02-24 PROCEDURE — 99078 GROUP HEALTH EDUCATION: CPT | Performed by: NURSE PRACTITIONER

## 2025-02-24 PROCEDURE — 99213 OFFICE O/P EST LOW 20 MIN: CPT

## 2025-02-24 PROCEDURE — 99213 OFFICE O/P EST LOW 20 MIN: CPT | Mod: GC,TH

## 2025-02-24 NOTE — LETTER
February 24, 2025     Patient: Myles Tompkins   YOB: 1995   Date of Visit: 2/24/2025       To Whom It May Concern:    It is my medical opinion that Myles Tompkins may return to light duty immediately with the following restrictions: breaks to sit every 2 hours .    If you have any questions or concerns, please don't hesitate to call.       Sincerely,  Tr Gonzalez MD

## 2025-02-24 NOTE — ASSESSMENT & PLAN NOTE
Desires ppTL with [facility], federal consent signed on 2/24  Planning to visit [email or web address] to review backup plans (IUD vs Nexplanon)

## 2025-02-24 NOTE — ASSESSMENT & PLAN NOTE
PEC precautions reviewed   Continues to decline BP cuff   Re discussed that, at this time, there is no indication for delivery prior to 39.0 wga

## 2025-02-24 NOTE — PROGRESS NOTES
Subjective   Myles Tompkins is a 29 y.o.  at 31w6d with a working estimated date of delivery of 2025, by Last Menstrual Period who presents for Centering visit #9.     Patient had routine visit with Dr. Gonzalez today, presents for Centering EDU only.      Problem List Items Addressed This Visit          Pregnancy    31 weeks gestation of pregnancy ([facility]) - Primary    Overview     Desired provider in labor: [] CNM  [x] Physician  - planning rCS  [x] Blood Products: [x] Yes, accepts [] No, needs counseling  [x] Initial BMI: 45.75   [x] Prenatal Labs: WNL  [x] Cervical Cancer Screening up to date: 24   [x] Rh status: O+  [x] Genetic Screening:  declined   [x] NT US: (11-13 wks): WNL  [x] Baby ASA (if indicated): patient declines  [x] Pregnancy dated by: LMP c/w 13w US    [x] Anatomy US (19-20 wks): fetal cardiac views not completed, fetal echo scheduled for 2/3  [x] Federal Sterilization consent signed (if indicated): signed   [x] 1hr GCT at 24-28wks: wnl   [x] Fetal Surveillance (if indicated): Growth US at 28/32/36 weeks in setting of prior FGR; BPP/NST weekly starting at 34 weeks  [x] Tdap (27-32 wks, may be given up to 36 wks if initial window missed): administered on   [x] Flu Vaccine: Oct 2024 per patient     [] Breastfeeding:  [x] Postpartum Birth control method: desires ppTL, planning to visit [email or web address] to review backup plans   [] GBS at 36 - 37 wks:  [] 39 weeks discussion of IOL vs. Expectant management:   [x] Mode of delivery ( anticipated ): [facility]              Centering Session #9 Plan:  -The following educational material was reviewed:  Postpartum planning/expectations  Breastfeeding discussion     -Reviewed s/sx of PTL, warning signs, fetal movement counts, and when to call provider  -Follow up in 2 weeks for next Centering visit or prn     Centering Visit total time 120min    Laura Doe, APRN-CNM, APRN-CNP

## 2025-02-24 NOTE — PROGRESS NOTES
OB Follow-up  25    SUBJECTIVE    HPI: Myles Tompkins is a 29 y.o.  at 31w6d here for RPNV. Denies contractions, bleeding, or LOF. Reports normal fetal movement. Patient reports feeling tired and low pelvic pressure that is worse with standing for an extended amount of time at work. Reports having headaches that typically resolve on their own. Has not been taking any medications for the headache. Denies CP, SOB, and RUQ pain. Reports only having some shortness of breath when experiencing asthma flares. Reports no use of her inhaler last week. Denies recent HSV outbreaks.     Patient Active Problem List   Diagnosis    Asthma affecting pregnancy, antepartum ([facility])    Food insecurity    Chronic hypertension affecting pregnancy ([facility])    Obesity in pregnancy ([facility])    Uterine scar from previous  delivery affecting pregnancy ([facility])    Previous baby with fetal growth restriction    Herpes simplex type 2 (HSV-2) infection affecting pregnancy, antepartum ([facility])    31 weeks gestation of pregnancy ([facility])    Suspected fetal abnormality affecting management of mother ([facility])       OBJECTIVE  Visit Vitals  /79 Comment: 91   Wt 112 kg (247 lb 14.4 oz)   LMP 2024   BMI 46.84 kg/m²   OB Status Pregnant   Smoking Status Never   BSA 2.2 m²      FHT: 159 bpm    ASSESSMENT & PLAN    Myles Tompkins is a 29 y.o.  at 31w6d here for the following concerns we addressed today:    Chronic hypertension affecting pregnancy ([facility])  PEC precautions reviewed   Continues to decline BP cuff   Re discussed that, at this time, there is no indication for delivery prior to 39.0 wga     31 weeks gestation of pregnancy ([facility])  Desires ppTL with [facility], federal consent signed on   Planning to visit [email or web address] to review backup plans (IUD vs Nexplanon)    Previous baby with fetal growth restriction  Next growth US scheduled for     Herpes simplex type 2 (HSV-2) infection  affecting pregnancy, antepartum ([facility])  Re discussed plan for ppx Valtrex at 36 weeks      RTC in 2 weeks  D/w Dr. Aminta Gonzalez MD, PGY-3

## 2025-02-25 ENCOUNTER — DOCUMENTATION (OUTPATIENT)
Dept: OBSTETRICS AND GYNECOLOGY | Facility: HOSPITAL | Age: 30
End: 2025-02-25
Payer: COMMERCIAL

## 2025-02-25 NOTE — PROGRESS NOTES
RN faxed document to [facility] for compression socks. Document submitted to be scanned to the patient's chart.  CARLOS Figueroa

## 2025-02-27 NOTE — PROGRESS NOTES
I reviewed the resident/fellow's documentation and discussed the patient with the resident/fellow. I agree with the resident/fellow's medical decision making as documented in the note.     Laura Lemos MD

## 2025-02-28 ENCOUNTER — HOSPITAL ENCOUNTER (OUTPATIENT)
Dept: RADIOLOGY | Facility: HOSPITAL | Age: 30
Discharge: HOME | End: 2025-02-28
Payer: COMMERCIAL

## 2025-02-28 DIAGNOSIS — Z3A.09 9 WEEKS GESTATION OF PREGNANCY (HHS-HCC): ICD-10-CM

## 2025-02-28 PROCEDURE — 76819 FETAL BIOPHYS PROFIL W/O NST: CPT

## 2025-02-28 PROCEDURE — 76816 OB US FOLLOW-UP PER FETUS: CPT

## 2025-03-02 PROCEDURE — RXMED WILLOW AMBULATORY MEDICATION CHARGE

## 2025-03-06 ENCOUNTER — PHARMACY VISIT (OUTPATIENT)
Dept: PHARMACY | Facility: CLINIC | Age: 30
End: 2025-03-06
Payer: MEDICAID

## 2025-03-06 ENCOUNTER — ROUTINE PRENATAL (OUTPATIENT)
Dept: OBSTETRICS AND GYNECOLOGY | Facility: CLINIC | Age: 30
End: 2025-03-06
Payer: COMMERCIAL

## 2025-03-06 VITALS
HEART RATE: 91 BPM | DIASTOLIC BLOOD PRESSURE: 76 MMHG | SYSTOLIC BLOOD PRESSURE: 112 MMHG | BODY MASS INDEX: 47.07 KG/M2 | WEIGHT: 249.1 LBS

## 2025-03-06 DIAGNOSIS — O99.519 ASTHMA AFFECTING PREGNANCY, ANTEPARTUM (HHS-HCC): Primary | ICD-10-CM

## 2025-03-06 DIAGNOSIS — O99.210 OBESITY IN PREGNANCY (HHS-HCC): ICD-10-CM

## 2025-03-06 DIAGNOSIS — Z30.2 ENCOUNTER FOR STERILIZATION: ICD-10-CM

## 2025-03-06 DIAGNOSIS — Z34.83 PRENATAL CARE, SUBSEQUENT PREGNANCY, THIRD TRIMESTER (HHS-HCC): ICD-10-CM

## 2025-03-06 DIAGNOSIS — J45.909 ASTHMA AFFECTING PREGNANCY, ANTEPARTUM (HHS-HCC): Primary | ICD-10-CM

## 2025-03-06 DIAGNOSIS — Z3A.33 33 WEEKS GESTATION OF PREGNANCY (HHS-HCC): ICD-10-CM

## 2025-03-06 DIAGNOSIS — O10.919 CHRONIC HYPERTENSION AFFECTING PREGNANCY (HHS-HCC): ICD-10-CM

## 2025-03-06 DIAGNOSIS — O09.93 SUPERVISION OF HIGH RISK PREGNANCY, ANTEPARTUM, THIRD TRIMESTER: ICD-10-CM

## 2025-03-06 PROCEDURE — 99214 OFFICE O/P EST MOD 30 MIN: CPT | Performed by: STUDENT IN AN ORGANIZED HEALTH CARE EDUCATION/TRAINING PROGRAM

## 2025-03-06 PROCEDURE — 99214 OFFICE O/P EST MOD 30 MIN: CPT | Mod: GC,TH | Performed by: STUDENT IN AN ORGANIZED HEALTH CARE EDUCATION/TRAINING PROGRAM

## 2025-03-06 NOTE — PROGRESS NOTES
History Of Present Illness  Myles Tompkins is a 29 y.o. female presenting with ***.     Past Medical History  Past Medical History:   Diagnosis Date    Asthma 2021    Chronic hypertension 2021    Eczema     Herpes     Hx of gonorrhea 2013    Hx of trichomonal vaginitis 2020    Migraine without aura, not intractable, without status migrainosus 2016    Personal history of other endocrine, nutritional and metabolic disease 2016    History of hyperglycemia       Surgical History  Past Surgical History:   Procedure Laterality Date     SECTION, LOW TRANSVERSE          Social History  She reports that she has never smoked. She has never used smokeless tobacco. She reports that she does not drink alcohol and does not use drugs.    Family History  No family history on file.     Allergies  Bee pollen    Review of Systems     Physical Exam     Last Recorded Vitals  Last menstrual period 2024.    Relevant Results  {If you would like to pull in Medications, type .meds     If you would like to pull in Lab results for the last 24 hours, type .[id number]    If you would like to pull in Imaging results, type .imgrslt :99}      ***     Assessment/Plan   {Assess/PlanSmartLinks:65510}    ***           BINH GONZALEZ

## 2025-03-06 NOTE — ASSESSMENT & PLAN NOTE
- planning rCS with TL likely around 39 wga depending on BP controlled; tasked to magdalena at 39.0  - serial growths, to start weekly NSTs next week  - 2nd tri labs complete and wnl

## 2025-03-06 NOTE — PROGRESS NOTES
Ob Visit  25     SUBJECTIVE      HPI: Myles Tompkins is a 29 y.o.  at 33w2d here for RPNV.  She denies contractions, bleeding, or LOF. Reports normal fetal movement. Patient reports doing well, some pelvic pressure.     Pregnancy notable for:  - cHTN; no meds  - obesity; BMI 47  - asthma  - h/o FGR    OBJECTIVE  Visit Vitals  /76   Pulse 91   Wt 113 kg (249 lb 1.6 oz)   LMP 2024   BMI 47.07 kg/m²   OB Status Pregnant   Smoking Status Never   BSA 2.21 m²          ASSESSMENT & PLAN    Myles Tompkins is a 29 y.o.  at 33w2d here for the following concerns we addressed today:    Problem List Items Addressed This Visit       Asthma affecting pregnancy, antepartum ([facility]) - Primary    Overview     No hospitalizations or intubations per patient's recollection         Current Assessment & Plan     - well controlled, rare albuterol use         Chronic hypertension affecting pregnancy ([facility])    Overview     No meds  Discussed ASA; patient declines due to previous lightheadedness in prior pregnancy   Baseline HELLP labs WNL at new OB, P/c ratio 0.07          Current Assessment & Plan     - normotensive today  - cont routine surveillance         Obesity in pregnancy ([facility])    Overview     BMI = 45.75 at NOB  Fetal surveillance BMI >40 at NOB:  BPP or NST weekly 34 wks to delivery  Growth scans starting at 28 weeks for h/o FGR     Timing of delivery: 39 0/7 - 39 6/7 wks  *BMI >= 50 at any time in pregnancy: Deliver at Level 4         Current Assessment & Plan     - weekly NSTs starting at 34 wga         33 weeks gestation of pregnancy ([facility])    Overview     Desired provider in labor: [] CNM  [x] Physician  - planning rCS  [x] Blood Products: [x] Yes, accepts [] No, needs counseling  [x] Initial BMI: 45.75   [x] Prenatal Labs: WNL  [x] Cervical Cancer Screening up to date: 24   [x] Rh status: O+  [x] Genetic Screening:  declined   [x] NT US: (11-13 wks):  WNL  [x] Baby ASA (if indicated): patient declines  [x] Pregnancy dated by: LMP c/w 13w US    [x] Anatomy US (19-20 wks): fetal cardiac views not completed, fetal echo scheduled for 2/3  [x] Federal Sterilization consent signed (if indicated): signed 2/24  [x] 1hr GCT at 24-28wks: wnl   [x] Fetal Surveillance (if indicated): Growth US at 28/32/36 weeks in setting of prior FGR; BPP/NST weekly starting at 34 weeks  [x] Tdap (27-32 wks, may be given up to 36 wks if initial window missed): administered on 1/30  [x] Flu Vaccine: Oct 2024 per patient     [] Breastfeeding: Not breastfeeding  [x] Postpartum Birth control method: desires ppTL (consent signed 2/24), planning to visit [email or web address] to review backup plans   [] GBS at 36 - 37 wks:  [x] Mode of delivery ( anticipated ): rCS; tasked           Current Assessment & Plan     - planning rCS with TL likely around 39 wga depending on BP controlled; tasked to magdalena at 39.0  - serial growths, to start weekly NSTs next week  - 2nd tri labs complete and wnl          Other Visit Diagnoses       Supervision of high risk pregnancy, antepartum, third trimester                  RTC in 1 wk for NST and 2 weeks for RPNV    Seen and d/w Dr. Heide Negrete MD

## 2025-03-06 NOTE — PROGRESS NOTES
C/Section and TL per Dr Negrete at 39 weeks GA  Scheduled for 4/15/2025 0830  Arrive 0630  Case request sent  Patient notified  Discussed labs, NPO after midnight, visitor policy   Will send ERAS letter

## 2025-03-06 NOTE — PROGRESS NOTES
I saw and evaluated the patient. I personally obtained the key and critical portions of the history and physical exam or was physically present for key and critical portions performed by the resident/fellow. I reviewed the resident/fellow's documentation and discussed the patient with the resident/fellow. I agree with the resident/fellow's medical decision making as documented in the note.    Digna Jaeger MD

## 2025-03-10 ENCOUNTER — PROCEDURE VISIT (OUTPATIENT)
Dept: OBSTETRICS AND GYNECOLOGY | Facility: CLINIC | Age: 30
End: 2025-03-10
Payer: COMMERCIAL

## 2025-03-10 ENCOUNTER — ROUTINE PRENATAL (OUTPATIENT)
Dept: OBSTETRICS AND GYNECOLOGY | Facility: CLINIC | Age: 30
End: 2025-03-10
Payer: COMMERCIAL

## 2025-03-10 ENCOUNTER — APPOINTMENT (OUTPATIENT)
Dept: OBSTETRICS AND GYNECOLOGY | Facility: CLINIC | Age: 30
End: 2025-03-10
Payer: COMMERCIAL

## 2025-03-10 ENCOUNTER — HOSPITAL ENCOUNTER (OUTPATIENT)
Facility: HOSPITAL | Age: 30
Setting detail: SURGERY ADMIT
Discharge: LEFT BEFORE DISCHARGE | End: 2025-03-10
Attending: SPECIALIST | Admitting: SPECIALIST
Payer: COMMERCIAL

## 2025-03-10 ENCOUNTER — HOSPITAL ENCOUNTER (OUTPATIENT)
Facility: HOSPITAL | Age: 30
Setting detail: SURGERY ADMIT
End: 2025-03-10
Attending: SPECIALIST | Admitting: SPECIALIST
Payer: COMMERCIAL

## 2025-03-10 VITALS
HEART RATE: 102 BPM | TEMPERATURE: 97.5 F | RESPIRATION RATE: 18 BRPM | WEIGHT: 254.3 LBS | SYSTOLIC BLOOD PRESSURE: 124 MMHG | HEIGHT: 61 IN | DIASTOLIC BLOOD PRESSURE: 79 MMHG | BODY MASS INDEX: 48.01 KG/M2 | OXYGEN SATURATION: 98 %

## 2025-03-10 VITALS
WEIGHT: 254.1 LBS | BODY MASS INDEX: 48.01 KG/M2 | DIASTOLIC BLOOD PRESSURE: 85 MMHG | SYSTOLIC BLOOD PRESSURE: 123 MMHG | HEART RATE: 101 BPM

## 2025-03-10 DIAGNOSIS — J45.909 ASTHMA AFFECTING PREGNANCY, ANTEPARTUM (HHS-HCC): ICD-10-CM

## 2025-03-10 DIAGNOSIS — B00.9 HERPES SIMPLEX TYPE 2 (HSV-2) INFECTION AFFECTING PREGNANCY, ANTEPARTUM (HHS-HCC): ICD-10-CM

## 2025-03-10 DIAGNOSIS — Z87.59 PREVIOUS BABY WITH FETAL GROWTH RESTRICTION: ICD-10-CM

## 2025-03-10 DIAGNOSIS — O34.219 UTERINE SCAR FROM PREVIOUS CESAREAN DELIVERY AFFECTING PREGNANCY (HHS-HCC): ICD-10-CM

## 2025-03-10 DIAGNOSIS — O28.8 NON-REACTIVE NST (NON-STRESS TEST): ICD-10-CM

## 2025-03-10 DIAGNOSIS — Z3A.33 33 WEEKS GESTATION OF PREGNANCY (HHS-HCC): ICD-10-CM

## 2025-03-10 DIAGNOSIS — O99.210 OBESITY IN PREGNANCY (HHS-HCC): ICD-10-CM

## 2025-03-10 DIAGNOSIS — O98.519 HERPES SIMPLEX TYPE 2 (HSV-2) INFECTION AFFECTING PREGNANCY, ANTEPARTUM (HHS-HCC): ICD-10-CM

## 2025-03-10 DIAGNOSIS — O10.919 CHRONIC HYPERTENSION AFFECTING PREGNANCY (HHS-HCC): ICD-10-CM

## 2025-03-10 DIAGNOSIS — O09.93 ENCOUNTER FOR SUPERVISION OF HIGH RISK PREGNANCY IN THIRD TRIMESTER, ANTEPARTUM: Primary | ICD-10-CM

## 2025-03-10 DIAGNOSIS — O99.519 ASTHMA AFFECTING PREGNANCY, ANTEPARTUM (HHS-HCC): ICD-10-CM

## 2025-03-10 LAB
ALBUMIN SERPL BCP-MCNC: 3.4 G/DL (ref 3.4–5)
ALP SERPL-CCNC: 146 U/L (ref 33–110)
ALT SERPL W P-5'-P-CCNC: 10 U/L (ref 7–45)
ANION GAP SERPL CALC-SCNC: 15 MMOL/L (ref 10–20)
AST SERPL W P-5'-P-CCNC: 11 U/L (ref 9–39)
BILIRUB SERPL-MCNC: 0.6 MG/DL (ref 0–1.2)
BILIRUBIN, POC: NEGATIVE
BLOOD URINE, POC: NEGATIVE
BUN SERPL-MCNC: 4 MG/DL (ref 6–23)
CALCIUM SERPL-MCNC: 8.8 MG/DL (ref 8.6–10.6)
CHLORIDE SERPL-SCNC: 107 MMOL/L (ref 98–107)
CLARITY, POC: CLEAR
CO2 SERPL-SCNC: 20 MMOL/L (ref 21–32)
COLOR, POC: YELLOW
CREAT SERPL-MCNC: 0.46 MG/DL (ref 0.5–1.05)
CREAT UR-MCNC: 141.4 MG/DL (ref 20–320)
EGFRCR SERPLBLD CKD-EPI 2021: >90 ML/MIN/1.73M*2
ERYTHROCYTE [DISTWIDTH] IN BLOOD BY AUTOMATED COUNT: 13.4 % (ref 11.5–14.5)
GLUCOSE SERPL-MCNC: 122 MG/DL (ref 74–99)
GLUCOSE URINE, POC: NEGATIVE
HCT VFR BLD AUTO: 37.3 % (ref 36–46)
HGB BLD-MCNC: 12.3 G/DL (ref 12–16)
KETONES, POC: NEGATIVE
LDH SERPL L TO P-CCNC: 125 U/L (ref 84–246)
LEUKOCYTE EST, POC: NEGATIVE
MCH RBC QN AUTO: 31.7 PG (ref 26–34)
MCHC RBC AUTO-ENTMCNC: 33 G/DL (ref 32–36)
MCV RBC AUTO: 96 FL (ref 80–100)
NITRITE, POC: NEGATIVE
NRBC BLD-RTO: 0 /100 WBCS (ref 0–0)
PH, POC: 6.5
PLATELET # BLD AUTO: 321 X10*3/UL (ref 150–450)
POC APPEARANCE OF BODY FLUID: NORMAL
POTASSIUM SERPL-SCNC: 3.6 MMOL/L (ref 3.5–5.3)
PROT SERPL-MCNC: 6.2 G/DL (ref 6.4–8.2)
PROT UR-ACNC: 18 MG/DL (ref 5–24)
PROT/CREAT UR: 0.13 MG/MG CREAT (ref 0–0.17)
RBC # BLD AUTO: 3.88 X10*6/UL (ref 4–5.2)
SODIUM SERPL-SCNC: 138 MMOL/L (ref 136–145)
SPECIFIC GRAVITY, POC: 1.02
URINE PROTEIN, POC: NORMAL
UROBILINOGEN, POC: 0.2
WBC # BLD AUTO: 13.4 X10*3/UL (ref 4.4–11.3)

## 2025-03-10 PROCEDURE — 59025 FETAL NON-STRESS TEST: CPT | Performed by: NURSE PRACTITIONER

## 2025-03-10 PROCEDURE — 83615 LACTATE (LD) (LDH) ENZYME: CPT | Performed by: STUDENT IN AN ORGANIZED HEALTH CARE EDUCATION/TRAINING PROGRAM

## 2025-03-10 PROCEDURE — 59025 FETAL NON-STRESS TEST: CPT | Performed by: STUDENT IN AN ORGANIZED HEALTH CARE EDUCATION/TRAINING PROGRAM

## 2025-03-10 PROCEDURE — 99214 OFFICE O/P EST MOD 30 MIN: CPT | Performed by: STUDENT IN AN ORGANIZED HEALTH CARE EDUCATION/TRAINING PROGRAM

## 2025-03-10 PROCEDURE — 99215 OFFICE O/P EST HI 40 MIN: CPT | Mod: 25

## 2025-03-10 PROCEDURE — 2500000001 HC RX 250 WO HCPCS SELF ADMINISTERED DRUGS (ALT 637 FOR MEDICARE OP): Mod: SE | Performed by: STUDENT IN AN ORGANIZED HEALTH CARE EDUCATION/TRAINING PROGRAM

## 2025-03-10 PROCEDURE — 84156 ASSAY OF PROTEIN URINE: CPT | Performed by: STUDENT IN AN ORGANIZED HEALTH CARE EDUCATION/TRAINING PROGRAM

## 2025-03-10 PROCEDURE — 36415 COLL VENOUS BLD VENIPUNCTURE: CPT

## 2025-03-10 PROCEDURE — 99214 OFFICE O/P EST MOD 30 MIN: CPT | Performed by: NURSE PRACTITIONER

## 2025-03-10 PROCEDURE — 99214 OFFICE O/P EST MOD 30 MIN: CPT | Mod: TH,25 | Performed by: NURSE PRACTITIONER

## 2025-03-10 PROCEDURE — 85027 COMPLETE CBC AUTOMATED: CPT | Performed by: STUDENT IN AN ORGANIZED HEALTH CARE EDUCATION/TRAINING PROGRAM

## 2025-03-10 PROCEDURE — 80053 COMPREHEN METABOLIC PANEL: CPT | Performed by: STUDENT IN AN ORGANIZED HEALTH CARE EDUCATION/TRAINING PROGRAM

## 2025-03-10 PROCEDURE — 99078 GROUP HEALTH EDUCATION: CPT | Performed by: NURSE PRACTITIONER

## 2025-03-10 PROCEDURE — H1002 CARECOORDINATION PRENATAL: HCPCS | Performed by: NURSE PRACTITIONER

## 2025-03-10 PROCEDURE — 36415 COLL VENOUS BLD VENIPUNCTURE: CPT | Performed by: STUDENT IN AN ORGANIZED HEALTH CARE EDUCATION/TRAINING PROGRAM

## 2025-03-10 RX ORDER — ONDANSETRON HYDROCHLORIDE 2 MG/ML
4 INJECTION, SOLUTION INTRAVENOUS EVERY 6 HOURS PRN
Status: DISCONTINUED | OUTPATIENT
Start: 2025-03-10 | End: 2025-03-11 | Stop reason: HOSPADM

## 2025-03-10 RX ORDER — NIFEDIPINE 10 MG/1
10 CAPSULE ORAL ONCE AS NEEDED
Status: DISCONTINUED | OUTPATIENT
Start: 2025-03-10 | End: 2025-03-11 | Stop reason: HOSPADM

## 2025-03-10 RX ORDER — HYDRALAZINE HYDROCHLORIDE 20 MG/ML
5 INJECTION INTRAMUSCULAR; INTRAVENOUS ONCE AS NEEDED
Status: DISCONTINUED | OUTPATIENT
Start: 2025-03-10 | End: 2025-03-11 | Stop reason: HOSPADM

## 2025-03-10 RX ORDER — LIDOCAINE HYDROCHLORIDE 10 MG/ML
0.5 INJECTION, SOLUTION INFILTRATION; PERINEURAL ONCE AS NEEDED
Status: DISCONTINUED | OUTPATIENT
Start: 2025-03-10 | End: 2025-03-11 | Stop reason: HOSPADM

## 2025-03-10 RX ORDER — LABETALOL HYDROCHLORIDE 5 MG/ML
20 INJECTION, SOLUTION INTRAVENOUS ONCE AS NEEDED
Status: DISCONTINUED | OUTPATIENT
Start: 2025-03-10 | End: 2025-03-11 | Stop reason: HOSPADM

## 2025-03-10 RX ORDER — ONDANSETRON 4 MG/1
4 TABLET, FILM COATED ORAL EVERY 6 HOURS PRN
Status: DISCONTINUED | OUTPATIENT
Start: 2025-03-10 | End: 2025-03-11 | Stop reason: HOSPADM

## 2025-03-10 RX ORDER — ALBUTEROL SULFATE 90 UG/1
2 INHALANT RESPIRATORY (INHALATION) EVERY 6 HOURS PRN
Status: DISCONTINUED | OUTPATIENT
Start: 2025-03-10 | End: 2025-03-11 | Stop reason: HOSPADM

## 2025-03-10 RX ADMIN — ALBUTEROL SULFATE 2 PUFF: 90 AEROSOL, METERED RESPIRATORY (INHALATION) at 17:30

## 2025-03-10 SDOH — SOCIAL STABILITY: SOCIAL INSECURITY: DOES ANYONE TRY TO KEEP YOU FROM HAVING/CONTACTING OTHER FRIENDS OR DOING THINGS OUTSIDE YOUR HOME?: NO

## 2025-03-10 SDOH — HEALTH STABILITY: MENTAL HEALTH: SUICIDAL BEHAVIOR (LIFETIME): NO

## 2025-03-10 SDOH — SOCIAL STABILITY: SOCIAL INSECURITY: HAS ANYONE EVER THREATENED TO HURT YOUR FAMILY OR YOUR PETS?: NO

## 2025-03-10 SDOH — SOCIAL STABILITY: SOCIAL INSECURITY: HAVE YOU HAD ANY THOUGHTS OF HARMING ANYONE ELSE?: NO

## 2025-03-10 SDOH — SOCIAL STABILITY: SOCIAL INSECURITY: VERBAL ABUSE: DENIES

## 2025-03-10 SDOH — HEALTH STABILITY: MENTAL HEALTH: NON-SPECIFIC ACTIVE SUICIDAL THOUGHTS (PAST 1 MONTH): NO

## 2025-03-10 SDOH — SOCIAL STABILITY: SOCIAL INSECURITY: DO YOU FEEL ANYONE HAS EXPLOITED OR TAKEN ADVANTAGE OF YOU FINANCIALLY OR OF YOUR PERSONAL PROPERTY?: NO

## 2025-03-10 SDOH — SOCIAL STABILITY: SOCIAL INSECURITY: ARE YOU OR HAVE YOU BEEN THREATENED OR ABUSED PHYSICALLY, EMOTIONALLY, OR SEXUALLY BY ANYONE?: NO

## 2025-03-10 SDOH — SOCIAL STABILITY: SOCIAL INSECURITY: PHYSICAL ABUSE: DENIES

## 2025-03-10 SDOH — SOCIAL STABILITY: SOCIAL INSECURITY: HAVE YOU HAD THOUGHTS OF HARMING ANYONE ELSE?: NO

## 2025-03-10 SDOH — SOCIAL STABILITY: SOCIAL INSECURITY: ABUSE SCREEN: ADULT

## 2025-03-10 SDOH — HEALTH STABILITY: MENTAL HEALTH: HAVE YOU USED ANY SUBSTANCES (CANABIS, COCAINE, HEROIN, HALLUCINOGENS, INHALANTS, ETC.) IN THE PAST 12 MONTHS?: NO

## 2025-03-10 SDOH — HEALTH STABILITY: MENTAL HEALTH: WISH TO BE DEAD (PAST 1 MONTH): NO

## 2025-03-10 SDOH — SOCIAL STABILITY: SOCIAL INSECURITY: ARE THERE ANY APPARENT SIGNS OF INJURIES/BEHAVIORS THAT COULD BE RELATED TO ABUSE/NEGLECT?: NO

## 2025-03-10 SDOH — ECONOMIC STABILITY: HOUSING INSECURITY: DO YOU FEEL UNSAFE GOING BACK TO THE PLACE WHERE YOU ARE LIVING?: NO

## 2025-03-10 SDOH — HEALTH STABILITY: MENTAL HEALTH: WERE YOU ABLE TO COMPLETE ALL THE BEHAVIORAL HEALTH SCREENINGS?: YES

## 2025-03-10 SDOH — HEALTH STABILITY: MENTAL HEALTH: HAVE YOU USED ANY PRESCRIPTION DRUGS OTHER THAN PRESCRIBED IN THE PAST 12 MONTHS?: NO

## 2025-03-10 ASSESSMENT — PAIN SCALES - GENERAL
PAINLEVEL_OUTOF10: 0 - NO PAIN

## 2025-03-10 ASSESSMENT — LIFESTYLE VARIABLES
SKIP TO QUESTIONS 9-10: 1
HOW OFTEN DO YOU HAVE A DRINK CONTAINING ALCOHOL: NEVER
AUDIT-C TOTAL SCORE: 0
HOW OFTEN DO YOU HAVE 6 OR MORE DRINKS ON ONE OCCASION: NEVER
HOW MANY STANDARD DRINKS CONTAINING ALCOHOL DO YOU HAVE ON A TYPICAL DAY: PATIENT DOES NOT DRINK
AUDIT-C TOTAL SCORE: 0

## 2025-03-10 NOTE — PROGRESS NOTES
Subjective   Myles Tompkins is a 29 y.o.  at 33w6d with a working estimated date of delivery of 2025, by Last Menstrual Period who presents for Centering visit #10.     1:1 Visit:   Patient feeling well overall, denies concerns today. She denies vaginal bleeding, leakage of fluid, or strong/consistent contractions. Endorses good fetal movement.     Objective   Physical Exam:   BP: 123/85  Wt: 115.259kg (254lb 1.6oz)  Expected Total Weight Gain: 5 kg (11 lb)-9 kg (19 lb)   Pregravid BMI: 45.75  Non-Stress Test   Baseline Fetal Heart Rate for Non-Stress Test: 150 BPM  Variability in Waveform for Non-Stress Test: Moderate  Accelerations in Non-Stress Test: No  Decelerations in Non-Stress Test: (!) Late  Contractions in Non-Stress Test: Irregular  Interpretation of Non-Stress Test   Interpretation of Non-Stress Test: (!) Non-reactive    Problem List Items Addressed This Visit          Pregnancy    33 weeks gestation of pregnancy ([facility])    Overview     Desired provider in labor: [] CNM  [x] Physician  - planning rCS  [x] Blood Products: [x] Yes, accepts [] No, needs counseling  [x] Initial BMI: 45.75   [x] Prenatal Labs: WNL  [x] Cervical Cancer Screening up to date: 24   [x] Rh status: O+  [x] Genetic Screening:  declined   [x] NT US: (11-13 wks): WNL  [x] Baby ASA (if indicated): patient declines  [x] Pregnancy dated by: LMP c/w 13w US    [x] Anatomy US (19-20 wks): fetal cardiac views not completed, fetal echo scheduled for 2/3  [x] Federal Sterilization consent signed (if indicated): signed   [x] 1hr GCT at 24-28wks: wnl   [x] Fetal Surveillance (if indicated): Growth US at 28/32/36 weeks in setting of prior FGR; BPP/NST weekly starting at 34 weeks  [x] Tdap (27-32 wks, may be given up to 36 wks if initial window missed): administered on   [x] Flu Vaccine: Oct 2024 per patient     [] Breastfeeding: Not breastfeeding  [x] Postpartum Birth control method: desires ppTL (consent  signed ), planning to visit [email or web address] to review backup plans   [] GBS at 36 - 37 wks:  [x] Mode of delivery ( anticipated ): rCS; tasked           Chronic hypertension affecting pregnancy ([facility])    Overview     No meds  Discussed ASA; patient declines due to previous lightheadedness in prior pregnancy   Baseline HELLP labs WNL at new OB, P/c ratio 0.07          Previous baby with fetal growth restriction    Overview     Growth US at 28, 32 and 36 wks         Uterine scar from previous  delivery affecting pregnancy ([facility])    Overview     PLTCS for breech presentation  Planning rLTCS         Asthma affecting pregnancy, antepartum ([facility])    Overview     No hospitalizations or intubations per patient's recollection          Other Visit Diagnoses       Encounter for supervision of high risk pregnancy in third trimester, antepartum    -  Primary    Non-reactive NST (non-stress test)                Centering Session #10 Plan:  - Advised patient to present to triage for prolonged monitoring and further evaluation.  - Has rCS scheduled for 4/15  - The following educational material was reviewed:  Postpartum changes    safety  -Reviewed s/sx of PTL, warning signs, fetal movement counts, and when to call provider  -Follow up in 1 weeks for ROBV or prn     1:1 total time 10min  Centering Visit total time 120min    Laura Doe, APRN-CNM, APRN-CNP

## 2025-03-10 NOTE — H&P
OB Triage H&P    Assessment/Plan    Myles Tompkins is a 29 y.o.  at 33w6d by LMP c/w 13w3d US who presents to triage for prolonged monitoring in s/o late decelerations.     Plan    NRFHT   - sent from office for late decels x 2 (NST for cHTN)  - NST with prolonged decels x 2 in triage   - BPP , SOHAM 12 by Dr. Hutchins  - subjective good fetal movement  - will keep for 2 hours of additional monitoring    cHTN, r/o siPEC  - no meds  - mild range x 1 in triage   - will collect HELLP labs and cycle BP   - asx     Maternal Well-being  - Emotional support and reassurance provided  - All questions and concerns addressed    IUP @ 33w6d  - prenatal lab work reviewed  - normal 1 hr gtt  - last growth EFW 1916g 32%, AC 32%  - Cat 2 tracing   - continue routine prenatal care     Dispo: pending continued fetal monitoring x 2 hours in triage and siPEC work-up as noted above    Discussed plan and reviewed with: Dr. Miguel and Dr. Saloni Prescott PA-C  03/10/25 7:48 PM  Vocera    Pt had declined BPP and continuous monitoring however after going to the cafeteria was amenable to getting a BPP- please see significant event note for that documentation. After the BPP per [facility] recommendations- resumed monitoring and pt again had a deceleration- pt stated that she needed to go home- discussed that there was concern for fetal well being we recommended further monitoring- risk of IUFD and fetal hypoxia- pt declined to stay and was discharged.  Saloni QUINATNA     Pregnancy Problems (from 24 to present)       Problem Noted Diagnosed Resolved    Suspected fetal abnormality affecting management of mother ([facility]) 2/3/2025 by Laura Mckeon MD  No    Priority:  Medium       Overview Signed 2/10/2025  8:40 AM by Tr Gonzalez MD     A fetal echocardiogram for incomplete cardiac views on obstetric ultrasound, demonstrated grossly normal fetal cardiac anatomy         Obesity in pregnancy ([facility]) 2024 by  ROCKY Lawson  No    Priority:  Medium       Overview Addendum 2/10/2025  9:09 AM by Tr Gonzalez MD     BMI = 45.75 at NOB  Fetal surveillance BMI >40 at NOB:  BPP or NST weekly 34 wks to delivery  Growth scans starting at 28 weeks for h/o FGR     Timing of delivery: 39 0/7 - 39 6/7 wks  *BMI >= 50 at any time in pregnancy: Deliver at Level 4         Uterine scar from previous  delivery affecting pregnancy ([facility]) 2024 by ROCKY Lawson  No    Priority:  Medium       Overview Addendum 2/10/2025  8:52 AM by Tr Gonzalez MD     PLTCS for breech presentation  Planning rLTCS         Previous baby with fetal growth restriction 2024 by ROCKY Lawson  No    Priority:  Medium       Overview Signed 10/8/2024  9:53 AM by ROCKY Castro, APRN-CNP     Growth US at 28, 32 and 36 wks         Herpes simplex type 2 (HSV-2) infection affecting pregnancy, antepartum ([facility]) 2024 by ROCKY Lawson  No    Priority:  Medium       Overview Signed 2024 12:36 PM by ROCKY Lawson     Diagnosed in 2019         33 weeks gestation of pregnancy ([facility]) 2024 by ROCKY Lawson  No    Priority:  Medium       Overview Addendum 3/6/2025 10:11 AM by Jeanine Negrete MD     Desired provider in labor: [] CNM  [x] Physician  - planning rCS  [x] Blood Products: [x] Yes, accepts [] No, needs counseling  [x] Initial BMI: 45.75   [x] Prenatal Labs: WNL  [x] Cervical Cancer Screening up to date: 24   [x] Rh status: O+  [x] Genetic Screening:  declined   [x] NT US: (11-13 wks): WNL  [x] Baby ASA (if indicated): patient declines  [x] Pregnancy dated by: LMP c/w 13w US    [x] Anatomy US (19-20 wks): fetal cardiac views not completed, fetal echo scheduled for 2/3  [x] Federal Sterilization consent signed (if indicated): signed   [x] 1hr GCT at 24-28wks: wnl   [x] Fetal Surveillance (if  indicated): Growth US at 28/32/36 weeks in setting of prior FGR; BPP/NST weekly starting at 34 weeks  [x] Tdap (27-32 wks, may be given up to 36 wks if initial window missed): administered on   [x] Flu Vaccine: Oct 2024 per patient     [] Breastfeeding: Not breastfeeding  [x] Postpartum Birth control method: desires ppTL (consent signed ), planning to visit [email or web address] to review backup plans   [] GBS at 36 - 37 wks:  [x] Mode of delivery ( anticipated ): rCS; tasked           Chronic hypertension affecting pregnancy ([facility])  by CELIA Patterson-ALEX  No    Priority:  Medium       Overview Addendum 2024 10:24 AM by Ellis Hutchins MD     No meds  Discussed ASA; patient declines due to previous lightheadedness in prior pregnancy   Baseline HELLP labs WNL at new OB, P/c ratio 0.07          Asthma affecting pregnancy, antepartum ([facility]) 1995 by Phu Guzman MD  No    Priority:  Medium       Overview Signed 2024 12:32 PM by CELIA Lawson-ALEX     No hospitalizations or intubations per patient's recollection                 Subjective   Myles Tompkins is a 29 y.o.  at 33w6d by LMP c/w 13w3d US who presents to triage for prolonged monitoring. Was seen in office today for routine prenatal visit and getting NSTs for cHTN. Was noted to have 2 late decels and thus sent to triage for monitoring. Feeling normal fetal movement. Denies recent falls/injuries. Denies contractions, LOF, or VB.     Prenatal Provider Centering/CNM    OB History    Para Term  AB Living   2 1 1 0 0 1   SAB IAB Ectopic Multiple Live Births   0 0 0 0 1      # Outcome Date GA Lbr Ankit/2nd Weight Sex Type Anes PTL Lv   2 Current            1 Term 20   2.466 kg M CS-LTranv EPI  CLARISA      Complications: IUGR (intrauterine growth restriction) affecting care of mother ([facility]), Breech birth ([facility])       Past Surgical History:   Procedure Laterality Date     SECTION,  LOW TRANSVERSE         Social History     Tobacco Use    Smoking status: Never    Smokeless tobacco: Never   Substance Use Topics    Alcohol use: Never       Allergies   Allergen Reactions    Bee Pollen Runny nose       Medications Prior to Admission   Medication Sig Dispense Refill Last Dose/Taking    prenatal vitamin, iron-folic, 27 mg iron-800 mcg folic acid tablet Take 1 tablet by mouth once daily. 90 tablet 3 3/9/2025    albuterol (Ventolin HFA) 90 mcg/actuation inhaler Inhale 1 puff every 6 hours if needed for wheezing. 18 g 11     mometasone (Asmanex Twisthaler) 220 mcg/ actuation (60)i inhaler Inhale 2 puffs once daily. 1 each 3      Objective     Last Vitals  Temp Pulse Resp BP MAP O2 Sat   36.5 °C (97.7 °F) 93   113/59 81 100 %     Blood Pressures         3/10/2025  1340             BP: 113/59             Physical Exam  General: NAD, mood appropriate  Cardiopulmonary: warm and well perfused, breathing comfortably on room air  Abdomen: Gravid, non-tender, soft  Extremities: Symmetric     Fetal Monitoring  Baseline: 140 bpm, Variability: moderate,  Accelerations: present and Decelerations: variable at 1410, prolonged decels at 1545 and 1557  Uterine Activity: Irregular contractions/irritability   Interpretation: Category 2    Bedside ultrasound: Yes - BPP 8/8, SOHAM 12 by Dr. Hutchins

## 2025-03-10 NOTE — LETTER
March 10, 2025     Patient: Myles Tompkins   YOB: 1995   Date of Visit: 3/10/2025       To Whom It May Concern:    Myles Tompkins was seen in my clinic on 3/10/2025 at 9:00 am. Please excuse Myles for her absence from work on this day to make the appointment.    If you have any questions or concerns, please don't hesitate to call.         Sincerely,         Laura Doe, CELIA-CNM, CELIA-CNP        CC: No Recipients

## 2025-03-11 NOTE — SIGNIFICANT EVENT
Discharge Against Medical Advice    Myles Tompkisn is a 29 y.o.  at 33w6d by LMP c/w 13w3d US who is admitted to triage for prolonged monitoring in s/o late decelerations. SOHAM 12 BPP was 8/8, but patient continued to have late decelerations every 2 hours. We discussed continued prolonged monitoring of baby overnight on labor and delivery but patient declined. Discussed IUFD, fetal hypoxia, placental insufficiency, and patient continues to declined admission. Patient also has cHTN with one mild range in triage when previously well controlled. HELLP labs wnl, P:C 0.13, asx. Discussed precautions and patient discharged against medical advice.     Ellis Hutchins, PGY4 OBGYN  Seen and discussed with Dr. Guallpa

## 2025-03-13 ENCOUNTER — APPOINTMENT (OUTPATIENT)
Dept: OBSTETRICS AND GYNECOLOGY | Facility: CLINIC | Age: 30
End: 2025-03-13
Payer: COMMERCIAL

## 2025-03-13 PROBLEM — O09.90 SUPERVISION OF HIGH RISK PREGNANCY, ANTEPARTUM (HHS-HCC): Status: ACTIVE | Noted: 2025-03-13

## 2025-03-13 PROBLEM — Z3A.34 34 WEEKS GESTATION OF PREGNANCY (HHS-HCC): Status: ACTIVE | Noted: 2024-09-18

## 2025-03-14 ENCOUNTER — APPOINTMENT (OUTPATIENT)
Dept: OBSTETRICS AND GYNECOLOGY | Facility: CLINIC | Age: 30
End: 2025-03-14
Payer: COMMERCIAL

## 2025-03-14 ENCOUNTER — ROUTINE PRENATAL (OUTPATIENT)
Dept: OBSTETRICS AND GYNECOLOGY | Facility: CLINIC | Age: 30
End: 2025-03-14
Payer: COMMERCIAL

## 2025-03-14 VITALS
SYSTOLIC BLOOD PRESSURE: 128 MMHG | DIASTOLIC BLOOD PRESSURE: 82 MMHG | WEIGHT: 251.7 LBS | HEART RATE: 96 BPM | BODY MASS INDEX: 47.56 KG/M2

## 2025-03-14 DIAGNOSIS — O10.919 CHRONIC HYPERTENSION AFFECTING PREGNANCY (HHS-HCC): Primary | ICD-10-CM

## 2025-03-14 DIAGNOSIS — Z86.19 HISTORY OF PCR DNA POSITIVE FOR HSV2: ICD-10-CM

## 2025-03-14 PROCEDURE — RXMED WILLOW AMBULATORY MEDICATION CHARGE

## 2025-03-14 PROCEDURE — 99214 OFFICE O/P EST MOD 30 MIN: CPT | Mod: GC,TH | Performed by: STUDENT IN AN ORGANIZED HEALTH CARE EDUCATION/TRAINING PROGRAM

## 2025-03-14 PROCEDURE — 99214 OFFICE O/P EST MOD 30 MIN: CPT | Performed by: STUDENT IN AN ORGANIZED HEALTH CARE EDUCATION/TRAINING PROGRAM

## 2025-03-14 RX ORDER — PANTOPRAZOLE SODIUM 20 MG/1
20 TABLET, DELAYED RELEASE ORAL
COMMUNITY

## 2025-03-14 RX ORDER — VALACYCLOVIR HYDROCHLORIDE 500 MG/1
500 TABLET, FILM COATED ORAL DAILY
Qty: 60 TABLET | Refills: 5 | Status: SHIPPED | OUTPATIENT
Start: 2025-03-14 | End: 2026-03-09

## 2025-03-14 RX ORDER — BLOOD PRESSURE TEST KIT-MEDIUM
1 KIT MISCELLANEOUS 2 TIMES DAILY
Qty: 1 KIT | Refills: 0 | Status: SHIPPED | OUTPATIENT
Start: 2025-03-14

## 2025-03-14 ASSESSMENT — PAIN SCALES - GENERAL: PAINLEVEL_OUTOF10: 0 - NO PAIN

## 2025-03-14 ASSESSMENT — ENCOUNTER SYMPTOMS
NEUROLOGICAL NEGATIVE: 0
EYES NEGATIVE: 0
ENDOCRINE NEGATIVE: 0
HEMATOLOGIC/LYMPHATIC NEGATIVE: 0
GASTROINTESTINAL NEGATIVE: 0
CARDIOVASCULAR NEGATIVE: 0
MUSCULOSKELETAL NEGATIVE: 0
RESPIRATORY NEGATIVE: 0
CONSTITUTIONAL NEGATIVE: 0
ALLERGIC/IMMUNOLOGIC NEGATIVE: 0
PSYCHIATRIC NEGATIVE: 0

## 2025-03-14 ASSESSMENT — PATIENT HEALTH QUESTIONNAIRE - PHQ9
1. LITTLE INTEREST OR PLEASURE IN DOING THINGS: NOT AT ALL
2. FEELING DOWN, DEPRESSED OR HOPELESS: NOT AT ALL
SUM OF ALL RESPONSES TO PHQ9 QUESTIONS 1 AND 2: 0

## 2025-03-14 ASSESSMENT — PAIN - FUNCTIONAL ASSESSMENT: PAIN_FUNCTIONAL_ASSESSMENT: 0-10

## 2025-03-14 NOTE — PROGRESS NOTES
Subjective   Patient ID [id number]   Myles Tompkins is a 29 y.o.  at 34w3d with a working estimated date of delivery of 2025, by Last Menstrual Period who presents for a routine prenatal visit. She denies vaginal bleeding, leakage of fluid, decreased fetal movements, or contractions. No chest pain, shortness of breath, vision changes, headache, or RUQ pain. Patient denies history of hypertension. Asthma well controlled. Patient with recent triage visit for late declerations on NST, was recommended observation overnight for monitoring but was unable to stay and left AMA.     Her pregnancy is complicated by:  - cHTN; no meds, baseline P:C 0.07, HELLP labs wnl. 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   - Food insecurity   - desire for BTL at time of rCS, consent signed      Objective   Physical Exam:   Weight: 114 kg (251 lb 11.2 oz)  Expected Total Weight Gain: 5 kg (11 lb)-9 kg (19 lb)   Pregravid BMI: 45.75  BP: 128/82                  Prenatal Labs  Urine Dip:  Lab Results   Component Value Date    KETONESU Negative 03/10/2025    GLUCOSEUR NEGATIVE 03/10/2025    LEUKOCYTESUR NEGATIVE 03/10/2025     Lab Results   Component Value Date    HGB 12.3 03/10/2025    HCT 37.3 03/10/2025    ABO O 2024    HEPBSAG Nonreactive 2024       Assessment/Plan   Problem List Items Addressed This Visit             ICD-10-CM    Chronic hypertension affecting pregnancy ([facility]) - Primary O10.919    Relevant Medications    blood pressure kit-extra large kit     Other Visit Diagnoses         Codes    History of PCR DNA positive for HSV2     Z86.19    Relevant Medications    valACYclovir (Valtrex) 500 mg tablet          Continue prenatal vitamin.  Patient without home BP cuff, Rx sent to pharmacy  Hx HSV, Rx  sent to pharmacy for suppression today   Expected mode of delivery rCS   Follow up in 1 week for a routine prenatal visit.    Today recommended NST, patient declined and stated she had a ride waiting for her, does have NST scheduled for Monday she will attend. Strict return precautions to L&D reviewed     Pt seen and dw Dr Mart Goodman MD  OB/GYN PGY4

## 2025-03-14 NOTE — PROGRESS NOTES
I saw and evaluated the patient. I personally obtained the key and critical portions of the history and physical exam or was physically present for key and critical portions performed by the resident/fellow. I reviewed the resident/fellow's documentation and discussed the patient with the resident/fellow. I agree with the resident/fellow's medical decision making as documented in the note.    Fabiana Cevallos MD

## 2025-03-17 ENCOUNTER — PHARMACY VISIT (OUTPATIENT)
Dept: PHARMACY | Facility: CLINIC | Age: 30
End: 2025-03-17
Payer: COMMERCIAL

## 2025-03-17 ENCOUNTER — PROCEDURE VISIT (OUTPATIENT)
Dept: OBSTETRICS AND GYNECOLOGY | Facility: CLINIC | Age: 30
End: 2025-03-17
Payer: COMMERCIAL

## 2025-03-17 VITALS — BODY MASS INDEX: 47.31 KG/M2 | WEIGHT: 250.4 LBS | SYSTOLIC BLOOD PRESSURE: 117 MMHG | DIASTOLIC BLOOD PRESSURE: 84 MMHG

## 2025-03-17 DIAGNOSIS — O10.919 CHRONIC HYPERTENSION AFFECTING PREGNANCY (HHS-HCC): ICD-10-CM

## 2025-03-17 DIAGNOSIS — O98.519 HERPES SIMPLEX TYPE 2 (HSV-2) INFECTION AFFECTING PREGNANCY, ANTEPARTUM (HHS-HCC): ICD-10-CM

## 2025-03-17 DIAGNOSIS — O99.519 ASTHMA AFFECTING PREGNANCY, ANTEPARTUM (HHS-HCC): ICD-10-CM

## 2025-03-17 DIAGNOSIS — Z87.59 PREVIOUS BABY WITH FETAL GROWTH RESTRICTION: ICD-10-CM

## 2025-03-17 DIAGNOSIS — O34.219 UTERINE SCAR FROM PREVIOUS CESAREAN DELIVERY AFFECTING PREGNANCY (HHS-HCC): ICD-10-CM

## 2025-03-17 DIAGNOSIS — J45.909 ASTHMA AFFECTING PREGNANCY, ANTEPARTUM (HHS-HCC): ICD-10-CM

## 2025-03-17 DIAGNOSIS — B00.9 HERPES SIMPLEX TYPE 2 (HSV-2) INFECTION AFFECTING PREGNANCY, ANTEPARTUM (HHS-HCC): ICD-10-CM

## 2025-03-17 DIAGNOSIS — Z3A.34 34 WEEKS GESTATION OF PREGNANCY (HHS-HCC): ICD-10-CM

## 2025-03-17 DIAGNOSIS — O99.210 OBESITY IN PREGNANCY (HHS-HCC): ICD-10-CM

## 2025-03-17 DIAGNOSIS — O09.90 SUPERVISION OF HIGH RISK PREGNANCY, ANTEPARTUM (HHS-HCC): ICD-10-CM

## 2025-03-17 PROCEDURE — 59025 FETAL NON-STRESS TEST: CPT | Performed by: STUDENT IN AN ORGANIZED HEALTH CARE EDUCATION/TRAINING PROGRAM

## 2025-03-17 NOTE — PROGRESS NOTES
NST at 34.6 weeks GA for CHTN, BMI 47 - Reactive per Dr Lemos.  Has OBFU scheduled 3/20/25 with Dr Negrete

## 2025-03-17 NOTE — PROGRESS NOTES
Non-Stress Test   Baseline Fetal Heart Rate for Non-Stress Test: 140 BPM  Variability in Waveform for Non-Stress Test: Moderate  Accelerations in Non-Stress Test: Yes, greater than/equal to 15 bpm, lasting at least 15 seconds  Decelerations in Non-Stress Test: None  Contractions in Non-Stress Test: Not present  Acoustic Stimulator for Non-Stress Test: Yes  Interpretation of Non-Stress Test   Interpretation of Non-Stress Test: Reactive

## 2025-03-20 ENCOUNTER — ROUTINE PRENATAL (OUTPATIENT)
Dept: OBSTETRICS AND GYNECOLOGY | Facility: CLINIC | Age: 30
End: 2025-03-20
Payer: COMMERCIAL

## 2025-03-20 VITALS
DIASTOLIC BLOOD PRESSURE: 81 MMHG | WEIGHT: 252.38 LBS | HEART RATE: 101 BPM | BODY MASS INDEX: 47.69 KG/M2 | SYSTOLIC BLOOD PRESSURE: 121 MMHG

## 2025-03-20 DIAGNOSIS — J45.909 ASTHMA AFFECTING PREGNANCY, ANTEPARTUM (HHS-HCC): ICD-10-CM

## 2025-03-20 DIAGNOSIS — O10.919 CHRONIC HYPERTENSION AFFECTING PREGNANCY (HHS-HCC): Primary | ICD-10-CM

## 2025-03-20 DIAGNOSIS — Z3A.35 35 WEEKS GESTATION OF PREGNANCY (HHS-HCC): ICD-10-CM

## 2025-03-20 DIAGNOSIS — O99.519 ASTHMA AFFECTING PREGNANCY, ANTEPARTUM (HHS-HCC): ICD-10-CM

## 2025-03-20 DIAGNOSIS — O09.90 SUPERVISION OF HIGH RISK PREGNANCY, ANTEPARTUM (HHS-HCC): ICD-10-CM

## 2025-03-20 DIAGNOSIS — O99.210 OBESITY IN PREGNANCY (HHS-HCC): ICD-10-CM

## 2025-03-20 DIAGNOSIS — O98.519 HERPES SIMPLEX TYPE 2 (HSV-2) INFECTION AFFECTING PREGNANCY, ANTEPARTUM (HHS-HCC): ICD-10-CM

## 2025-03-20 DIAGNOSIS — B00.9 HERPES SIMPLEX TYPE 2 (HSV-2) INFECTION AFFECTING PREGNANCY, ANTEPARTUM (HHS-HCC): ICD-10-CM

## 2025-03-20 PROCEDURE — 99214 OFFICE O/P EST MOD 30 MIN: CPT | Mod: GC,TH | Performed by: STUDENT IN AN ORGANIZED HEALTH CARE EDUCATION/TRAINING PROGRAM

## 2025-03-20 PROCEDURE — 99214 OFFICE O/P EST MOD 30 MIN: CPT | Performed by: STUDENT IN AN ORGANIZED HEALTH CARE EDUCATION/TRAINING PROGRAM

## 2025-03-20 ASSESSMENT — PAIN - FUNCTIONAL ASSESSMENT: PAIN_FUNCTIONAL_ASSESSMENT: 0-10

## 2025-03-20 ASSESSMENT — PAIN SCALES - GENERAL: PAINLEVEL_OUTOF10: 0 - NO PAIN

## 2025-03-20 NOTE — ASSESSMENT & PLAN NOTE
- care up to date  - GBS next visit  - weekly  testing and serial growths  - rCS and TL magdalena for 4/15

## 2025-03-20 NOTE — PROGRESS NOTES
Ob Visit  25     SUBJECTIVE      HPI: Myles Tompkins is a 29 y.o.  at 35w2d here for RPNV.  She denies contractions, bleeding, or LOF. Reports normal fetal movement. Patient reports feeling ready for delivery.     Her pregnancy is complicated by:  - cHTN; no meds, baseline P:C 0.07, HELLP labs wnl. Declined asa   - obesity; BMI 47  - asthma, mometasone and albuterol prn   - h/o FGR, last growth us  32w3d EFW 1916g 32%, AC 32%  - prior  section for breech, desires rCS   - Hx HSV2 diagnosed , valtrex suppression    - Food insecurity   - desire for BTL at time of rCS, consent signed       OBJECTIVE  Visit Vitals  /81   Pulse 101   Wt 114 kg (252 lb 6 oz)   LMP 2024   BMI 47.69 kg/m²   OB Status Pregnant   Smoking Status Never   BSA 2.21 m²      Expected Total Weight Gain: 5 kg (11 lb)-9 kg (19 lb)  Pregravid BMI: 45.75  BP: 121/81  Fetal Heart Rate: 153    ASSESSMENT & PLAN    Myles Tompkins is a 29 y.o.  at 35w2d here for the following concerns we addressed today:    Problem List Items Addressed This Visit       Asthma affecting pregnancy, antepartum ([facility])    Overview     No hospitalizations or intubations per patient's recollection   mometasone and albuterol prn          Current Assessment & Plan     - well controlled, rare albuterol use; not using mometasone         Chronic hypertension affecting pregnancy ([facility]) - Primary    Overview     No meds  Discussed ASA; patient declines due to previous lightheadedness in prior pregnancy   Baseline HELLP labs WNL at new OB, P/c ratio 0.07          Current Assessment & Plan     - asx  - normotensive today         Obesity in pregnancy ([facility])    Overview     BMI = 45.75 at NOB  Fetal surveillance BMI >40 at NOB:  BPP or NST weekly 34 wks to delivery  Growth scans starting at 28 weeks for h/o FGR     Timing of delivery: 39 0/7 - 39 6/7 wks  *BMI >= 50 at any time in pregnancy: Deliver at Level 4          Current Assessment & Plan     - weekly NST on          Herpes simplex type 2 (HSV-2) infection affecting pregnancy, antepartum ([facility])    Overview     Diagnosed in   On valtrex         Current Assessment & Plan     - valtrex suppression         35 weeks gestation of pregnancy ([facility])    Overview     Desired provider in labor: [] CNM  [x] Physician  - planning rCS  [x] Blood Products: [x] Yes, accepts [] No, needs counseling  [x] Initial BMI: 45.75   [x] Prenatal Labs: WNL  [x] Cervical Cancer Screening up to date: 24   [x] Rh status: O+  [x] Genetic Screening:  declined   [x] NT US: (11-13 wks): WNL  [x] Baby ASA (if indicated): patient declines  [x] Pregnancy dated by: LMP c/w 13w US    [x] Anatomy US (19-20 wks): fetal cardiac views not completed, fetal echo scheduled for 2/3  [x] Federal Sterilization consent signed (if indicated): signed   [x] 1hr GCT at 24-28wks: wnl   [x] Fetal Surveillance (if indicated): Growth US at 28/32/36 weeks in setting of prior FGR; BPP/NST weekly starting at 34 weeks  [x] Tdap (27-32 wks, may be given up to 36 wks if initial window missed): administered on   [x] Flu Vaccine: Oct 2024 per patient     [] Breastfeeding: Not breastfeeding  [x] Postpartum Birth control method: desires ppTL (consent signed ), planning to visit [email or web address] to review backup plans   [] GBS at 36 - 37 wks:  [x] Mode of delivery ( anticipated ): rCS- scheduled 4/15           Current Assessment & Plan     - care up to date  - GBS next visit  - weekly  testing and serial growths  - rCS and TL magdalena for 4/15         Supervision of high risk pregnancy, antepartum ([facility])         RTC in 1 weeks    Discussed with Dr. Mikey Negrete MD

## 2025-03-24 ENCOUNTER — DOCUMENTATION (OUTPATIENT)
Dept: OBSTETRICS AND GYNECOLOGY | Facility: CLINIC | Age: 30
End: 2025-03-24

## 2025-03-24 ENCOUNTER — PROCEDURE VISIT (OUTPATIENT)
Dept: OBSTETRICS AND GYNECOLOGY | Facility: CLINIC | Age: 30
End: 2025-03-24
Payer: COMMERCIAL

## 2025-03-24 DIAGNOSIS — O10.919 CHRONIC HYPERTENSION AFFECTING PREGNANCY (HHS-HCC): ICD-10-CM

## 2025-03-24 DIAGNOSIS — O99.210 OBESITY IN PREGNANCY (HHS-HCC): Primary | ICD-10-CM

## 2025-03-24 PROCEDURE — 59025 FETAL NON-STRESS TEST: CPT | Performed by: OBSTETRICS & GYNECOLOGY

## 2025-03-24 NOTE — PROCEDURES
Myles Wolfeabelardo Tompkins, a  at 35w6d with an MARLENE of 2025, by Last Menstrual Period, was seen at [facility] for a nonstress test.    Non-Stress Test   Baseline Fetal Heart Rate for Non-Stress Test: 140 BPM  Variability in Waveform for Non-Stress Test: Moderate  Accelerations in Non-Stress Test: Yes, greater than/equal to 15 bpm, lasting at least 15 seconds  Decelerations in Non-Stress Test: None  Contractions in Non-Stress Test: Not present  Acoustic Stimulator for Non-Stress Test: No  Interpretation of Non-Stress Test   Interpretation of Non-Stress Test: Reactive  Comments on Non-Stress Test: Baseline change from 150-140 noted

## 2025-03-27 PROBLEM — Z3A.36 36 WEEKS GESTATION OF PREGNANCY (HHS-HCC): Status: ACTIVE | Noted: 2024-09-18

## 2025-03-27 NOTE — PROGRESS NOTES
Subjective   Myles Tompkins is a 29 y.o.  at 36w3d with a working estimated date of delivery of 2025, by Last Menstrual Period who presents for a routine prenatal visit.     Patient reports increased pelvic pressure. She denies vaginal bleeding, leakage of fluid, decreased fetal movements, or contractions.    Objective   Physical Exam:   Weight: 115 kg (254 lb 5.4 oz)  TW.597 kg (12 lb 5.4 oz)  BP: 128/83  Fetal Heart Rate: 150 Fundal Height (cm): 38 cm Presentation: Vertex  Dilation: Closed Effacement (%): 50 Fetal Station: -3    Assessment/Plan   Problem List Items Addressed This Visit          Pregnancy    36 weeks gestation of pregnancy ([facility])    Overview     Desired provider in labor: [] CNM  [x] Physician  - planning rCS  [x] Blood Products: [x] Yes, accepts [] No, needs counseling  [x] Initial BMI: 45.75   [x] Prenatal Labs: WNL  [x] Cervical Cancer Screening up to date: 24   [x] Rh status: O+  [x] Genetic Screening:  declined   [x] NT US: (11-13 wks): WNL  [x] Baby ASA (if indicated): patient declines  [x] Pregnancy dated by: LMP c/w 13w US    [x] Anatomy US (19-20 wks): fetal cardiac views not completed, fetal echo scheduled for 2/3  [x] Federal Sterilization consent signed (if indicated): signed   [x] 1hr GCT at 24-28wks: wnl   [x] Fetal Surveillance (if indicated): Growth US at 28/32/36 weeks in setting of prior FGR; BPP/NST weekly starting at 34 weeks  [x] Tdap (27-32 wks, may be given up to 36 wks if initial window missed): administered on   [x] Flu Vaccine: Oct 2024 per patient     [] Breastfeeding: Not breastfeeding  [x] Postpartum Birth control method: desires ppTL (consent signed ), planning to visit [email or web address] to review backup plans   [] GBS at 36 - 37 wks:  [x] Mode of delivery ( anticipated ): rCS- scheduled 4/15           Chronic hypertension affecting pregnancy ([facility])    Overview     No meds  Discussed ASA; patient declines due to previous  lightheadedness in prior pregnancy   Baseline HELLP labs WNL at new OB, P/c ratio 0.07          Herpes simplex type 2 (HSV-2) infection affecting pregnancy, antepartum ([facility])    Overview     Diagnosed in 2019  On valtrex         Uterine scar from previous  delivery affecting pregnancy ([facility])    Overview     PLTCS for breech presentation  Planning rLTCS          Other Visit Diagnoses       Encounter for supervision of high risk pregnancy in third trimester, antepartum    -  Primary    Relevant Orders    Group B Streptococcus (GBS) Prenatal Screen, Culture          Growth US done today, report pending  rCS scheduled for 4/15  Reviewed s/sx of PTL, warning signs, fetal movement counts, and when to call provider  Follow up in 1 week for a routine prenatal visit.    Laura Doe, APRN-CNM, APRN-CNP

## 2025-03-28 ENCOUNTER — HOSPITAL ENCOUNTER (OUTPATIENT)
Dept: RADIOLOGY | Facility: HOSPITAL | Age: 30
Discharge: HOME | End: 2025-03-28
Payer: COMMERCIAL

## 2025-03-28 ENCOUNTER — ROUTINE PRENATAL (OUTPATIENT)
Dept: OBSTETRICS AND GYNECOLOGY | Facility: CLINIC | Age: 30
End: 2025-03-28
Payer: COMMERCIAL

## 2025-03-28 VITALS
HEART RATE: 94 BPM | DIASTOLIC BLOOD PRESSURE: 83 MMHG | BODY MASS INDEX: 48.06 KG/M2 | WEIGHT: 254.34 LBS | SYSTOLIC BLOOD PRESSURE: 128 MMHG

## 2025-03-28 DIAGNOSIS — O09.93 ENCOUNTER FOR SUPERVISION OF HIGH RISK PREGNANCY IN THIRD TRIMESTER, ANTEPARTUM: Primary | ICD-10-CM

## 2025-03-28 DIAGNOSIS — O98.519 HERPES SIMPLEX TYPE 2 (HSV-2) INFECTION AFFECTING PREGNANCY, ANTEPARTUM (HHS-HCC): ICD-10-CM

## 2025-03-28 DIAGNOSIS — B00.9 HERPES SIMPLEX TYPE 2 (HSV-2) INFECTION AFFECTING PREGNANCY, ANTEPARTUM (HHS-HCC): ICD-10-CM

## 2025-03-28 DIAGNOSIS — O34.219 UTERINE SCAR FROM PREVIOUS CESAREAN DELIVERY AFFECTING PREGNANCY (HHS-HCC): ICD-10-CM

## 2025-03-28 DIAGNOSIS — Z3A.09 9 WEEKS GESTATION OF PREGNANCY (HHS-HCC): ICD-10-CM

## 2025-03-28 DIAGNOSIS — Z3A.36 36 WEEKS GESTATION OF PREGNANCY (HHS-HCC): ICD-10-CM

## 2025-03-28 DIAGNOSIS — O10.919 CHRONIC HYPERTENSION AFFECTING PREGNANCY (HHS-HCC): ICD-10-CM

## 2025-03-28 PROCEDURE — 76816 OB US FOLLOW-UP PER FETUS: CPT

## 2025-03-28 PROCEDURE — 99214 OFFICE O/P EST MOD 30 MIN: CPT | Mod: TH,25 | Performed by: NURSE PRACTITIONER

## 2025-03-28 PROCEDURE — 99214 OFFICE O/P EST MOD 30 MIN: CPT | Performed by: NURSE PRACTITIONER

## 2025-03-28 PROCEDURE — 76819 FETAL BIOPHYS PROFIL W/O NST: CPT

## 2025-03-31 ENCOUNTER — APPOINTMENT (OUTPATIENT)
Dept: OBSTETRICS AND GYNECOLOGY | Facility: CLINIC | Age: 30
End: 2025-03-31
Payer: COMMERCIAL

## 2025-03-31 PROBLEM — B95.1 POSITIVE GBS TEST: Status: ACTIVE | Noted: 2025-03-31

## 2025-03-31 LAB — GP B STREP SPEC QL CULT: ABNORMAL

## 2025-03-31 PROCEDURE — RXMED WILLOW AMBULATORY MEDICATION CHARGE

## 2025-04-02 ENCOUNTER — PHARMACY VISIT (OUTPATIENT)
Dept: PHARMACY | Facility: CLINIC | Age: 30
End: 2025-04-02
Payer: MEDICAID

## 2025-04-03 ENCOUNTER — ROUTINE PRENATAL (OUTPATIENT)
Dept: OBSTETRICS AND GYNECOLOGY | Facility: CLINIC | Age: 30
End: 2025-04-03
Payer: COMMERCIAL

## 2025-04-03 ENCOUNTER — APPOINTMENT (OUTPATIENT)
Dept: PRIMARY CARE | Facility: CLINIC | Age: 30
End: 2025-04-03
Payer: COMMERCIAL

## 2025-04-03 VITALS — WEIGHT: 256.6 LBS | BODY MASS INDEX: 48.48 KG/M2

## 2025-04-03 DIAGNOSIS — Z87.59 PREVIOUS BABY WITH FETAL GROWTH RESTRICTION: ICD-10-CM

## 2025-04-03 DIAGNOSIS — B95.1 POSITIVE GBS TEST: ICD-10-CM

## 2025-04-03 DIAGNOSIS — O99.519 ASTHMA AFFECTING PREGNANCY, ANTEPARTUM (HHS-HCC): ICD-10-CM

## 2025-04-03 DIAGNOSIS — O98.519 HERPES SIMPLEX TYPE 2 (HSV-2) INFECTION AFFECTING PREGNANCY, ANTEPARTUM (HHS-HCC): ICD-10-CM

## 2025-04-03 DIAGNOSIS — Z3A.37 37 WEEKS GESTATION OF PREGNANCY (HHS-HCC): ICD-10-CM

## 2025-04-03 DIAGNOSIS — O10.919 CHRONIC HYPERTENSION AFFECTING PREGNANCY (HHS-HCC): Primary | ICD-10-CM

## 2025-04-03 DIAGNOSIS — Z30.2 REQUEST FOR STERILIZATION: ICD-10-CM

## 2025-04-03 DIAGNOSIS — J45.909 ASTHMA AFFECTING PREGNANCY, ANTEPARTUM (HHS-HCC): ICD-10-CM

## 2025-04-03 DIAGNOSIS — B00.9 HERPES SIMPLEX TYPE 2 (HSV-2) INFECTION AFFECTING PREGNANCY, ANTEPARTUM (HHS-HCC): ICD-10-CM

## 2025-04-03 DIAGNOSIS — O34.219 UTERINE SCAR FROM PREVIOUS CESAREAN DELIVERY AFFECTING PREGNANCY (HHS-HCC): ICD-10-CM

## 2025-04-03 PROCEDURE — 99214 OFFICE O/P EST MOD 30 MIN: CPT | Performed by: STUDENT IN AN ORGANIZED HEALTH CARE EDUCATION/TRAINING PROGRAM

## 2025-04-03 PROCEDURE — 99214 OFFICE O/P EST MOD 30 MIN: CPT | Mod: GC,TH | Performed by: STUDENT IN AN ORGANIZED HEALTH CARE EDUCATION/TRAINING PROGRAM

## 2025-04-03 ASSESSMENT — PATIENT HEALTH QUESTIONNAIRE - PHQ9
2. FEELING DOWN, DEPRESSED OR HOPELESS: NOT AT ALL
SUM OF ALL RESPONSES TO PHQ9 QUESTIONS 1 AND 2: 0
1. LITTLE INTEREST OR PLEASURE IN DOING THINGS: NOT AT ALL

## 2025-04-03 ASSESSMENT — ENCOUNTER SYMPTOMS
CARDIOVASCULAR NEGATIVE: 0
PSYCHIATRIC NEGATIVE: 0
GASTROINTESTINAL NEGATIVE: 0
HEMATOLOGIC/LYMPHATIC NEGATIVE: 0
CONSTITUTIONAL NEGATIVE: 0
RESPIRATORY NEGATIVE: 0
MUSCULOSKELETAL NEGATIVE: 0
ENDOCRINE NEGATIVE: 0
EYES NEGATIVE: 0
ALLERGIC/IMMUNOLOGIC NEGATIVE: 0
NEUROLOGICAL NEGATIVE: 0

## 2025-04-03 NOTE — PROGRESS NOTES
Ob Visit  25     SUBJECTIVE      HPI: Myles Tompkins is a 29 y.o.  at 37w2d here for RPNV.  She has nocontractions, bleeding, or LOF. Reports normal fetal movement. Patient reports no other acute concerns at today's visit. Denies HA, RUQ pain, blurry vision.        OBJECTIVE  Visit Vitals  Wt 116 kg (256 lb 9.6 oz)   LMP 2024   BMI 48.48 kg/m²   OB Status Pregnant   Smoking Status Never   BSA 2.23 m²            ASSESSMENT & PLAN    Myles Tompkins is a 29 y.o.  at 37w2d here for the following concerns we addressed today:    Problem List Items Addressed This Visit       Asthma affecting pregnancy, antepartum ([facility])    Overview     No hospitalizations or intubations per patient's recollection   mometasone and albuterol prn          Current Assessment & Plan     -Stable, no acute concerns, [place] mometasone and albuterol PRN         Chronic hypertension affecting pregnancy ([facility]) - Primary    Overview     No meds  Discussed ASA; patient declines due to previous lightheadedness in prior pregnancy   Baseline HELLP labs WNL at [facility], P/c ratio 0.07          Current Assessment & Plan     -BP normotensive in office today  -Baseline HELLP labs wnl  -Asymptomatic  -PEC precautions discussed with patient           Uterine scar from previous  delivery affecting pregnancy ([facility])    Overview     PLTCS for breech presentation  Planning rLTCS         Current Assessment & Plan     -[place] [facility]  -Outpatient labs to be ordered for preop at next week's visit         Previous baby with fetal growth restriction    Overview     Growth US at 28, 32 and 36 wks- (30% AC, EFW 32%)         Current Assessment & Plan     -Last Growth US 3/28 EFW 2706g 30%, AC 35%         Herpes simplex type 2 (HSV-2) infection affecting pregnancy, antepartum ([facility])    Overview     Diagnosed in 2019  On valtrex         Current Assessment & Plan     -Asymptomatic, no recent lesions or  c/f breakouts         37 weeks gestation of pregnancy ([facility])    Overview     Desired provider in labor: [] CNM  [x] Physician  - planning rCS  [x] Blood Products: [x] Yes, accepts [] No, needs counseling  [x] Initial BMI: 45.75   [x] Prenatal Labs: WNL  [x] Cervical Cancer Screening up to date: 9/18/24   [x] Rh status: O+  [x] Genetic Screening:  declined 9/30  [x] NT US: (11-13 wks): WNL  [x] Baby ASA (if indicated): patient declines  [x] Pregnancy dated by: LMP c/w 13w US    [x] Anatomy US (19-20 wks): fetal cardiac views not completed, fetal echo scheduled for 2/3  [x] Federal Sterilization consent signed (if indicated): signed 2/24  [x] 1hr GCT at 24-28wks: wnl   [x] Fetal Surveillance (if indicated): Growth US at 28/32/36 weeks in setting of prior FGR; BPP/NST weekly starting at 34 weeks  [x] Tdap (27-32 wks, may be given up to 36 wks if initial window missed): administered on 1/30  [x] Flu Vaccine: Oct 2024 per patient     [] Breastfeeding: Not breastfeeding  [x] Postpartum Birth control method: desires ppTL (consent signed 2/24), planning to visit [email or web address] to review backup plans   [x] GBS at 36 - 37 wks: GBS Positive 3/28  [x] Mode of delivery ( anticipated ): rCS- scheduled 4/15           Request for sterilization    Overview     Consent signed 2/24/25, scanned into Media tab         Positive GBS test         D/w Dr. Lemos    RTC in 1 weeks      Juice Bates MD

## 2025-04-03 NOTE — ASSESSMENT & PLAN NOTE
-BP normotensive in office today  -Baseline HELLP labs wnl  -Asymptomatic  -PEC precautions discussed with patient

## 2025-04-03 NOTE — ASSESSMENT & PLAN NOTE
-[place] scheduled 4/15 [place] [facility]  -Outpatient labs to be ordered for preop at next week's visit

## 2025-04-04 ENCOUNTER — ANESTHESIA (OUTPATIENT)
Dept: OBSTETRICS AND GYNECOLOGY | Facility: HOSPITAL | Age: 30
End: 2025-04-04
Payer: COMMERCIAL

## 2025-04-04 ENCOUNTER — ANESTHESIA EVENT (OUTPATIENT)
Dept: OBSTETRICS AND GYNECOLOGY | Facility: HOSPITAL | Age: 30
End: 2025-04-04
Payer: COMMERCIAL

## 2025-04-04 ENCOUNTER — HOSPITAL ENCOUNTER (INPATIENT)
Facility: HOSPITAL | Age: 30
Discharge: STILL ADMITTED OR ADMINISTRATIVE | End: 2025-04-04
Attending: OBSTETRICS & GYNECOLOGY | Admitting: OBSTETRICS & GYNECOLOGY
Payer: COMMERCIAL

## 2025-04-04 DIAGNOSIS — O34.219 UTERINE SCAR FROM PREVIOUS CESAREAN DELIVERY AFFECTING PREGNANCY (HHS-HCC): ICD-10-CM

## 2025-04-04 DIAGNOSIS — O36.8390 NON-REASSURING FETAL HEART RATE OR RHYTHM AFFECTING MANAGEMENT OF MOTHER: Primary | ICD-10-CM

## 2025-04-04 PROBLEM — K21.9 GASTROESOPHAGEAL REFLUX DISEASE: Status: ACTIVE | Noted: 2025-04-04

## 2025-04-04 LAB
ABO GROUP (TYPE) IN BLOOD: NORMAL
ALBUMIN SERPL BCP-MCNC: 3.3 G/DL (ref 3.4–5)
ALP SERPL-CCNC: 168 U/L (ref 33–110)
ALT SERPL W P-5'-P-CCNC: 11 U/L (ref 7–45)
ANION GAP SERPL CALC-SCNC: 14 MMOL/L (ref 10–20)
ANTIBODY SCREEN: NORMAL
AST SERPL W P-5'-P-CCNC: 16 U/L (ref 9–39)
BILIRUB SERPL-MCNC: 0.5 MG/DL (ref 0–1.2)
BLOOD EXPIRATION DATE: NORMAL
BUN SERPL-MCNC: 7 MG/DL (ref 6–23)
CALCIUM SERPL-MCNC: 9.1 MG/DL (ref 8.6–10.6)
CHLORIDE SERPL-SCNC: 106 MMOL/L (ref 98–107)
CO2 SERPL-SCNC: 21 MMOL/L (ref 21–32)
CREAT SERPL-MCNC: 0.47 MG/DL (ref 0.5–1.05)
CREAT UR-MCNC: 156.1 MG/DL (ref 20–320)
DISPENSE STATUS: NORMAL
EGFRCR SERPLBLD CKD-EPI 2021: >90 ML/MIN/1.73M*2
ERYTHROCYTE [DISTWIDTH] IN BLOOD BY AUTOMATED COUNT: 13.8 % (ref 11.5–14.5)
GLUCOSE SERPL-MCNC: 101 MG/DL (ref 74–99)
HCT VFR BLD AUTO: 39.1 % (ref 36–46)
HGB BLD-MCNC: 12.6 G/DL (ref 12–16)
MCH RBC QN AUTO: 32.4 PG (ref 26–34)
MCHC RBC AUTO-ENTMCNC: 32.2 G/DL (ref 32–36)
MCV RBC AUTO: 101 FL (ref 80–100)
NRBC BLD-RTO: 0 /100 WBCS (ref 0–0)
PLATELET # BLD AUTO: 314 X10*3/UL (ref 150–450)
POC APPEARANCE, URINE: CLEAR
POC BILIRUBIN, URINE: NEGATIVE
POC BLOOD, URINE: NEGATIVE
POC COLOR, URINE: YELLOW
POC GLUCOSE, URINE: NEGATIVE MG/DL
POC KETONES, URINE: ABNORMAL MG/DL
POC LEUKOCYTES, URINE: NEGATIVE
POC NITRITE,URINE: NEGATIVE
POC PH, URINE: 6 PH
POC PROTEIN, URINE: ABNORMAL MG/DL
POC SPECIFIC GRAVITY, URINE: >=1.03
POC UROBILINOGEN, URINE: 0.2 EU/DL
POTASSIUM SERPL-SCNC: 4.4 MMOL/L (ref 3.5–5.3)
PRODUCT BLOOD TYPE: 5100
PRODUCT CODE: NORMAL
PROT SERPL-MCNC: 5.9 G/DL (ref 6.4–8.2)
PROT UR-ACNC: 29 MG/DL (ref 5–24)
PROT/CREAT UR: 0.19 MG/MG CREAT (ref 0–0.17)
RBC # BLD AUTO: 3.89 X10*6/UL (ref 4–5.2)
RH FACTOR (ANTIGEN D): NORMAL
SODIUM SERPL-SCNC: 137 MMOL/L (ref 136–145)
UNIT ABO: NORMAL
UNIT NUMBER: NORMAL
UNIT RH: NORMAL
UNIT VOLUME: 350
WBC # BLD AUTO: 11.6 X10*3/UL (ref 4.4–11.3)
XM INTEP: NORMAL

## 2025-04-04 PROCEDURE — 99215 OFFICE O/P EST HI 40 MIN: CPT

## 2025-04-04 PROCEDURE — 85027 COMPLETE CBC AUTOMATED: CPT | Performed by: STUDENT IN AN ORGANIZED HEALTH CARE EDUCATION/TRAINING PROGRAM

## 2025-04-04 PROCEDURE — 84075 ASSAY ALKALINE PHOSPHATASE: CPT | Performed by: STUDENT IN AN ORGANIZED HEALTH CARE EDUCATION/TRAINING PROGRAM

## 2025-04-04 PROCEDURE — 36415 COLL VENOUS BLD VENIPUNCTURE: CPT | Performed by: STUDENT IN AN ORGANIZED HEALTH CARE EDUCATION/TRAINING PROGRAM

## 2025-04-04 PROCEDURE — 2500000001 HC RX 250 WO HCPCS SELF ADMINISTERED DRUGS (ALT 637 FOR MEDICARE OP): Mod: SE

## 2025-04-04 PROCEDURE — 86923 COMPATIBILITY TEST ELECTRIC: CPT

## 2025-04-04 PROCEDURE — 86901 BLOOD TYPING SEROLOGIC RH(D): CPT | Performed by: STUDENT IN AN ORGANIZED HEALTH CARE EDUCATION/TRAINING PROGRAM

## 2025-04-04 PROCEDURE — 99223 1ST HOSP IP/OBS HIGH 75: CPT

## 2025-04-04 PROCEDURE — 36415 COLL VENOUS BLD VENIPUNCTURE: CPT

## 2025-04-04 PROCEDURE — 1100000001 HC PRIVATE ROOM DAILY

## 2025-04-04 PROCEDURE — 86780 TREPONEMA PALLIDUM: CPT | Performed by: STUDENT IN AN ORGANIZED HEALTH CARE EDUCATION/TRAINING PROGRAM

## 2025-04-04 PROCEDURE — 4500999001 HC ED NO CHARGE

## 2025-04-04 PROCEDURE — 2500000004 HC RX 250 GENERAL PHARMACY W/ HCPCS (ALT 636 FOR OP/ED): Mod: SE

## 2025-04-04 PROCEDURE — 2500000001 HC RX 250 WO HCPCS SELF ADMINISTERED DRUGS (ALT 637 FOR MEDICARE OP): Mod: SE | Performed by: STUDENT IN AN ORGANIZED HEALTH CARE EDUCATION/TRAINING PROGRAM

## 2025-04-04 PROCEDURE — 84156 ASSAY OF PROTEIN URINE: CPT

## 2025-04-04 PROCEDURE — 82570 ASSAY OF URINE CREATININE: CPT

## 2025-04-04 PROCEDURE — 86850 RBC ANTIBODY SCREEN: CPT | Performed by: STUDENT IN AN ORGANIZED HEALTH CARE EDUCATION/TRAINING PROGRAM

## 2025-04-04 RX ORDER — LABETALOL HYDROCHLORIDE 5 MG/ML
20 INJECTION, SOLUTION INTRAVENOUS ONCE AS NEEDED
Status: DISCONTINUED | OUTPATIENT
Start: 2025-04-04 | End: 2025-04-05 | Stop reason: SDUPTHER

## 2025-04-04 RX ORDER — HYDRALAZINE HYDROCHLORIDE 20 MG/ML
5 INJECTION INTRAMUSCULAR; INTRAVENOUS ONCE AS NEEDED
Status: DISCONTINUED | OUTPATIENT
Start: 2025-04-04 | End: 2025-04-05 | Stop reason: SDUPTHER

## 2025-04-04 RX ORDER — ACETAMINOPHEN 325 MG/1
975 TABLET ORAL EVERY 6 HOURS PRN
Status: DISCONTINUED | OUTPATIENT
Start: 2025-04-04 | End: 2025-04-05

## 2025-04-04 RX ORDER — ONDANSETRON HYDROCHLORIDE 2 MG/ML
4 INJECTION, SOLUTION INTRAVENOUS EVERY 6 HOURS PRN
Status: DISCONTINUED | OUTPATIENT
Start: 2025-04-04 | End: 2025-04-05

## 2025-04-04 RX ORDER — METOCLOPRAMIDE 10 MG/1
10 TABLET ORAL ONCE
Status: COMPLETED | OUTPATIENT
Start: 2025-04-04 | End: 2025-04-04

## 2025-04-04 RX ORDER — DIPHENHYDRAMINE HCL 25 MG
25 CAPSULE ORAL ONCE
Status: COMPLETED | OUTPATIENT
Start: 2025-04-04 | End: 2025-04-04

## 2025-04-04 RX ORDER — LIDOCAINE HYDROCHLORIDE 10 MG/ML
0.5 INJECTION, SOLUTION INFILTRATION; PERINEURAL ONCE AS NEEDED
Status: DISCONTINUED | OUTPATIENT
Start: 2025-04-04 | End: 2025-04-05

## 2025-04-04 RX ORDER — ONDANSETRON 4 MG/1
4 TABLET, FILM COATED ORAL EVERY 6 HOURS PRN
Status: DISCONTINUED | OUTPATIENT
Start: 2025-04-04 | End: 2025-04-05 | Stop reason: SDUPTHER

## 2025-04-04 RX ORDER — SUMATRIPTAN SUCCINATE 50 MG/1
50 TABLET ORAL ONCE
Status: COMPLETED | OUTPATIENT
Start: 2025-04-04 | End: 2025-04-04

## 2025-04-04 RX ADMIN — DIPHENHYDRAMINE HYDROCHLORIDE 25 MG: 25 CAPSULE ORAL at 21:13

## 2025-04-04 RX ADMIN — ACETAMINOPHEN 975 MG: 325 TABLET ORAL at 21:13

## 2025-04-04 RX ADMIN — SUMATRIPTAN SUCCINATE 50 MG: 50 TABLET ORAL at 21:38

## 2025-04-04 RX ADMIN — METOCLOPRAMIDE 10 MG: 10 TABLET ORAL at 21:13

## 2025-04-04 RX ADMIN — SODIUM CHLORIDE, SODIUM LACTATE, POTASSIUM CHLORIDE, AND CALCIUM CHLORIDE 1000 ML: .6; .31; .03; .02 INJECTION, SOLUTION INTRAVENOUS at 23:02

## 2025-04-04 SDOH — SOCIAL STABILITY: SOCIAL INSECURITY: VERBAL ABUSE: DENIES

## 2025-04-04 SDOH — SOCIAL STABILITY: SOCIAL INSECURITY: HAVE YOU HAD THOUGHTS OF HARMING ANYONE ELSE?: NO

## 2025-04-04 SDOH — SOCIAL STABILITY: SOCIAL INSECURITY: PHYSICAL ABUSE: DENIES

## 2025-04-04 SDOH — HEALTH STABILITY: MENTAL HEALTH: NON-SPECIFIC ACTIVE SUICIDAL THOUGHTS (PAST 1 MONTH): NO

## 2025-04-04 SDOH — HEALTH STABILITY: MENTAL HEALTH: SUICIDAL BEHAVIOR (LIFETIME): NO

## 2025-04-04 SDOH — SOCIAL STABILITY: SOCIAL INSECURITY: HAS ANYONE EVER THREATENED TO HURT YOUR FAMILY OR YOUR PETS?: NO

## 2025-04-04 SDOH — SOCIAL STABILITY: SOCIAL INSECURITY: ARE YOU OR HAVE YOU BEEN THREATENED OR ABUSED PHYSICALLY, EMOTIONALLY, OR SEXUALLY BY ANYONE?: NO

## 2025-04-04 SDOH — ECONOMIC STABILITY: HOUSING INSECURITY: DO YOU FEEL UNSAFE GOING BACK TO THE PLACE WHERE YOU ARE LIVING?: NO

## 2025-04-04 SDOH — SOCIAL STABILITY: SOCIAL INSECURITY: ABUSE SCREEN: ADULT

## 2025-04-04 SDOH — HEALTH STABILITY: MENTAL HEALTH: WISH TO BE DEAD (PAST 1 MONTH): NO

## 2025-04-04 SDOH — SOCIAL STABILITY: SOCIAL INSECURITY: HAVE YOU HAD ANY THOUGHTS OF HARMING ANYONE ELSE?: NO

## 2025-04-04 SDOH — SOCIAL STABILITY: SOCIAL INSECURITY: ARE THERE ANY APPARENT SIGNS OF INJURIES/BEHAVIORS THAT COULD BE RELATED TO ABUSE/NEGLECT?: NO

## 2025-04-04 SDOH — SOCIAL STABILITY: SOCIAL INSECURITY: DOES ANYONE TRY TO KEEP YOU FROM HAVING/CONTACTING OTHER FRIENDS OR DOING THINGS OUTSIDE YOUR HOME?: NO

## 2025-04-04 SDOH — HEALTH STABILITY: MENTAL HEALTH: WERE YOU ABLE TO COMPLETE ALL THE BEHAVIORAL HEALTH SCREENINGS?: YES

## 2025-04-04 SDOH — SOCIAL STABILITY: SOCIAL INSECURITY: DO YOU FEEL ANYONE HAS EXPLOITED OR TAKEN ADVANTAGE OF YOU FINANCIALLY OR OF YOUR PERSONAL PROPERTY?: NO

## 2025-04-04 ASSESSMENT — PAIN SCALES - GENERAL
PAINLEVEL_OUTOF10: 10 - WORST POSSIBLE PAIN
PAINLEVEL_OUTOF10: 10 - WORST POSSIBLE PAIN
PAINLEVEL_OUTOF10: 0 - NO PAIN
PAINLEVEL_OUTOF10: 10 - WORST POSSIBLE PAIN
PAINLEVEL_OUTOF10: 5 - MODERATE PAIN
PAINLEVEL_OUTOF10: 0 - NO PAIN
PAINLEVEL_OUTOF10: 0 - NO PAIN

## 2025-04-04 ASSESSMENT — PATIENT HEALTH QUESTIONNAIRE - PHQ9
1. LITTLE INTEREST OR PLEASURE IN DOING THINGS: NOT AT ALL
2. FEELING DOWN, DEPRESSED OR HOPELESS: NOT AT ALL
SUM OF ALL RESPONSES TO PHQ9 QUESTIONS 1 & 2: 0

## 2025-04-04 ASSESSMENT — PAIN DESCRIPTION - LOCATION
LOCATION: ABDOMEN
LOCATION: BACK

## 2025-04-04 ASSESSMENT — LIFESTYLE VARIABLES
AUDIT-C TOTAL SCORE: 0
SKIP TO QUESTIONS 9-10: 1
HOW OFTEN DO YOU HAVE 6 OR MORE DRINKS ON ONE OCCASION: NEVER
HOW OFTEN DO YOU HAVE A DRINK CONTAINING ALCOHOL: NEVER
AUDIT-C TOTAL SCORE: 0
HOW MANY STANDARD DRINKS CONTAINING ALCOHOL DO YOU HAVE ON A TYPICAL DAY: PATIENT DOES NOT DRINK

## 2025-04-04 ASSESSMENT — PAIN SCALES - PAIN ASSESSMENT IN ADVANCED DEMENTIA (PAINAD): TOTALSCORE: MEDICATION (SEE MAR)

## 2025-04-04 ASSESSMENT — ACTIVITIES OF DAILY LIVING (ADL): LACK_OF_TRANSPORTATION: NO

## 2025-04-04 ASSESSMENT — PAIN - FUNCTIONAL ASSESSMENT: PAIN_FUNCTIONAL_ASSESSMENT: 0-10

## 2025-04-04 ASSESSMENT — PAIN DESCRIPTION - PAIN TYPE: TYPE: ACUTE PAIN

## 2025-04-04 NOTE — PROGRESS NOTES
I reviewed the resident/fellow's documentation and discussed the patient with the resident/fellow. I agree with the resident/fellow's medical decision making as documented in the note.     Laura Lemso MD

## 2025-04-04 NOTE — ED TRIAGE NOTES
PT presents to ED via triage for chief complaint fo headache and abdominal pain. PT states her headache started today and she has had on and off abdominal pain. PT also endorsing some nausea. PT is 37 weeks pregnant. PT has a scheduled  on 4/15/25. PT has a history of asthma. PT is . PT is Aox4 and ambulates on her own.

## 2025-04-05 LAB
BASE EXCESS BLDCOV CALC-SCNC: -2.7 MMOL/L (ref -8.1–-0.5)
BODY TEMPERATURE: 37 DEGREES CELSIUS
HCO3 BLDCOV-SCNC: 24.5 MMOL/L (ref 16–26)
INHALED O2 CONCENTRATION: 21 %
OXYHGB MFR BLDCOV: 41.8 % (ref 94–98)
PCO2 BLDCOV: 51 MM HG (ref 22–53)
PH BLDCOV: 7.29 PH (ref 7.19–7.47)
PO2 BLDCOV: 23 MM HG (ref 13–37)
SAO2 % BLDCOV: 43 % (ref 16–84)
TREPONEMA PALLIDUM IGG+IGM AB [PRESENCE] IN SERUM OR PLASMA BY IMMUNOASSAY: NONREACTIVE

## 2025-04-05 PROCEDURE — 82805 BLOOD GASES W/O2 SATURATION: CPT | Performed by: OBSTETRICS & GYNECOLOGY

## 2025-04-05 PROCEDURE — 2720000007 HC OR 272 NO HCPCS

## 2025-04-05 PROCEDURE — 2500000004 HC RX 250 GENERAL PHARMACY W/ HCPCS (ALT 636 FOR OP/ED): Mod: SE

## 2025-04-05 PROCEDURE — 2500000001 HC RX 250 WO HCPCS SELF ADMINISTERED DRUGS (ALT 637 FOR MEDICARE OP): Mod: SE | Performed by: STUDENT IN AN ORGANIZED HEALTH CARE EDUCATION/TRAINING PROGRAM

## 2025-04-05 PROCEDURE — 7100000016 HC LABOR RECOVERY PER HOUR: Performed by: OBSTETRICS & GYNECOLOGY

## 2025-04-05 PROCEDURE — 59514 CESAREAN DELIVERY ONLY: CPT | Performed by: OBSTETRICS & GYNECOLOGY

## 2025-04-05 PROCEDURE — 2500000004 HC RX 250 GENERAL PHARMACY W/ HCPCS (ALT 636 FOR OP/ED): Mod: SE | Performed by: STUDENT IN AN ORGANIZED HEALTH CARE EDUCATION/TRAINING PROGRAM

## 2025-04-05 PROCEDURE — 88307 TISSUE EXAM BY PATHOLOGIST: CPT | Performed by: PATHOLOGY

## 2025-04-05 PROCEDURE — 2500000004 HC RX 250 GENERAL PHARMACY W/ HCPCS (ALT 636 FOR OP/ED): Mod: SE | Performed by: ANESTHESIOLOGIST ASSISTANT

## 2025-04-05 PROCEDURE — 1100000001 HC PRIVATE ROOM DAILY

## 2025-04-05 PROCEDURE — 2720000007 HC OR 272 NO HCPCS: Performed by: OBSTETRICS & GYNECOLOGY

## 2025-04-05 PROCEDURE — 59514 CESAREAN DELIVERY ONLY: CPT

## 2025-04-05 PROCEDURE — 88307 TISSUE EXAM BY PATHOLOGIST: CPT | Mod: TC,SUR | Performed by: OBSTETRICS & GYNECOLOGY

## 2025-04-05 PROCEDURE — 3700000014 HC AN EPIDURAL BLOCK CHARGE: Performed by: OBSTETRICS & GYNECOLOGY

## 2025-04-05 RX ORDER — OXYTOCIN/0.9 % SODIUM CHLORIDE 30/500 ML
60 PLASTIC BAG, INJECTION (ML) INTRAVENOUS ONCE AS NEEDED
Status: ACTIVE | OUTPATIENT
Start: 2025-04-05

## 2025-04-05 RX ORDER — KETOROLAC TROMETHAMINE 30 MG/ML
30 INJECTION, SOLUTION INTRAMUSCULAR; INTRAVENOUS EVERY 6 HOURS
Status: COMPLETED | OUTPATIENT
Start: 2025-04-05 | End: 2025-04-05

## 2025-04-05 RX ORDER — LIDOCAINE HYDROCHLORIDE AND EPINEPHRINE 15; 5 MG/ML; UG/ML
INJECTION, SOLUTION EPIDURAL AS NEEDED
Status: DISCONTINUED | OUTPATIENT
Start: 2025-04-05 | End: 2025-04-05

## 2025-04-05 RX ORDER — CEFAZOLIN 1 G/1
INJECTION, POWDER, FOR SOLUTION INTRAVENOUS AS NEEDED
Status: DISCONTINUED | OUTPATIENT
Start: 2025-04-05 | End: 2025-04-05

## 2025-04-05 RX ORDER — ENOXAPARIN SODIUM 100 MG/ML
60 INJECTION SUBCUTANEOUS EVERY 24 HOURS
Status: DISPENSED | OUTPATIENT
Start: 2025-04-06

## 2025-04-05 RX ORDER — IBUPROFEN 600 MG/1
600 TABLET ORAL EVERY 6 HOURS
Status: DISPENSED | OUTPATIENT
Start: 2025-04-06

## 2025-04-05 RX ORDER — HYDRALAZINE HYDROCHLORIDE 20 MG/ML
5 INJECTION INTRAMUSCULAR; INTRAVENOUS ONCE AS NEEDED
Status: ACTIVE | OUTPATIENT
Start: 2025-04-05

## 2025-04-05 RX ORDER — LABETALOL HYDROCHLORIDE 5 MG/ML
20 INJECTION, SOLUTION INTRAVENOUS ONCE AS NEEDED
Status: ACTIVE | OUTPATIENT
Start: 2025-04-05

## 2025-04-05 RX ORDER — LOPERAMIDE HYDROCHLORIDE 2 MG/1
4 CAPSULE ORAL EVERY 2 HOUR PRN
Status: ACTIVE | OUTPATIENT
Start: 2025-04-05

## 2025-04-05 RX ORDER — METHYLERGONOVINE MALEATE 0.2 MG/ML
0.2 INJECTION INTRAVENOUS ONCE AS NEEDED
Status: ACTIVE | OUTPATIENT
Start: 2025-04-05

## 2025-04-05 RX ORDER — HYDROMORPHONE HYDROCHLORIDE 0.2 MG/ML
0.2 INJECTION INTRAMUSCULAR; INTRAVENOUS; SUBCUTANEOUS EVERY 5 MIN PRN
Status: ACTIVE | OUTPATIENT
Start: 2025-04-05

## 2025-04-05 RX ORDER — MISOPROSTOL 200 UG/1
800 TABLET ORAL ONCE AS NEEDED
Status: ACTIVE | OUTPATIENT
Start: 2025-04-05

## 2025-04-05 RX ORDER — CARBOPROST TROMETHAMINE 250 UG/ML
250 INJECTION, SOLUTION INTRAMUSCULAR ONCE AS NEEDED
Status: ACTIVE | OUTPATIENT
Start: 2025-04-05

## 2025-04-05 RX ORDER — KETOROLAC TROMETHAMINE 30 MG/ML
INJECTION, SOLUTION INTRAMUSCULAR; INTRAVENOUS AS NEEDED
Status: DISCONTINUED | OUTPATIENT
Start: 2025-04-05 | End: 2025-04-05

## 2025-04-05 RX ORDER — FAMOTIDINE 10 MG/ML
INJECTION, SOLUTION INTRAVENOUS AS NEEDED
Status: DISCONTINUED | OUTPATIENT
Start: 2025-04-05 | End: 2025-04-05

## 2025-04-05 RX ORDER — PHENYLEPHRINE HCL IN 0.9% NACL 0.4MG/10ML
SYRINGE (ML) INTRAVENOUS AS NEEDED
Status: DISCONTINUED | OUTPATIENT
Start: 2025-04-05 | End: 2025-04-05

## 2025-04-05 RX ORDER — LIDOCAINE 560 MG/1
1 PATCH PERCUTANEOUS; TOPICAL; TRANSDERMAL
Status: ACTIVE | OUTPATIENT
Start: 2025-04-05

## 2025-04-05 RX ORDER — FENTANYL CITRATE 50 UG/ML
INJECTION, SOLUTION INTRAMUSCULAR; INTRAVENOUS AS NEEDED
Status: DISCONTINUED | OUTPATIENT
Start: 2025-04-05 | End: 2025-04-05

## 2025-04-05 RX ORDER — ACETAMINOPHEN 325 MG/1
975 TABLET ORAL EVERY 6 HOURS
Status: DISPENSED | OUTPATIENT
Start: 2025-04-05

## 2025-04-05 RX ORDER — OXYCODONE HYDROCHLORIDE 10 MG/1
10 TABLET ORAL EVERY 4 HOURS PRN
Status: ACTIVE | OUTPATIENT
Start: 2025-04-06

## 2025-04-05 RX ORDER — TRANEXAMIC ACID 100 MG/ML
1000 INJECTION, SOLUTION INTRAVENOUS ONCE AS NEEDED
Status: ACTIVE | OUTPATIENT
Start: 2025-04-05 | End: 2025-04-08

## 2025-04-05 RX ORDER — POLYETHYLENE GLYCOL 3350 17 G/17G
17 POWDER, FOR SOLUTION ORAL 2 TIMES DAILY PRN
Status: ACTIVE | OUTPATIENT
Start: 2025-04-05

## 2025-04-05 RX ORDER — MORPHINE SULFATE 0.5 MG/ML
INJECTION, SOLUTION EPIDURAL; INTRATHECAL; INTRAVENOUS AS NEEDED
Status: DISCONTINUED | OUTPATIENT
Start: 2025-04-05 | End: 2025-04-05

## 2025-04-05 RX ORDER — ADHESIVE BANDAGE
10 BANDAGE TOPICAL
Status: ACTIVE | OUTPATIENT
Start: 2025-04-05

## 2025-04-05 RX ORDER — NALOXONE HYDROCHLORIDE 0.4 MG/ML
0.1 INJECTION, SOLUTION INTRAMUSCULAR; INTRAVENOUS; SUBCUTANEOUS EVERY 5 MIN PRN
Status: ACTIVE | OUTPATIENT
Start: 2025-04-05

## 2025-04-05 RX ORDER — ALBUTEROL SULFATE 90 UG/1
1 INHALANT RESPIRATORY (INHALATION) EVERY 6 HOURS PRN
Status: DISPENSED | OUTPATIENT
Start: 2025-04-05

## 2025-04-05 RX ORDER — DIPHENHYDRAMINE HCL 25 MG
25 CAPSULE ORAL EVERY 4 HOURS PRN
Status: DISPENSED | OUTPATIENT
Start: 2025-04-05

## 2025-04-05 RX ORDER — BUPIVACAINE HYDROCHLORIDE 7.5 MG/ML
INJECTION INTRAVENOUS AS NEEDED
Status: DISCONTINUED | OUTPATIENT
Start: 2025-04-05 | End: 2025-04-05

## 2025-04-05 RX ORDER — ONDANSETRON 4 MG/1
4 TABLET, FILM COATED ORAL EVERY 6 HOURS PRN
Status: ACTIVE | OUTPATIENT
Start: 2025-04-05

## 2025-04-05 RX ORDER — SIMETHICONE 80 MG
80 TABLET,CHEWABLE ORAL 4 TIMES DAILY PRN
Status: ACTIVE | OUTPATIENT
Start: 2025-04-05

## 2025-04-05 RX ORDER — NALBUPHINE HYDROCHLORIDE 10 MG/ML
5 INJECTION INTRAMUSCULAR; INTRAVENOUS; SUBCUTANEOUS ONCE
Status: COMPLETED | OUTPATIENT
Start: 2025-04-05 | End: 2025-04-05

## 2025-04-05 RX ORDER — PHENYLEPHRINE 10 MG/250 ML(40 MCG/ML)IN 0.9 % SOD.CHLORIDE INTRAVENOUS
CONTINUOUS PRN
Status: DISCONTINUED | OUTPATIENT
Start: 2025-04-05 | End: 2025-04-05

## 2025-04-05 RX ORDER — OXYCODONE HYDROCHLORIDE 5 MG/1
5 TABLET ORAL EVERY 4 HOURS PRN
Status: ACTIVE | OUTPATIENT
Start: 2025-04-06

## 2025-04-05 RX ORDER — OXYTOCIN 10 [USP'U]/ML
10 INJECTION, SOLUTION INTRAMUSCULAR; INTRAVENOUS ONCE AS NEEDED
Status: ACTIVE | OUTPATIENT
Start: 2025-04-05

## 2025-04-05 RX ORDER — DIPHENHYDRAMINE HYDROCHLORIDE 50 MG/ML
25 INJECTION, SOLUTION INTRAMUSCULAR; INTRAVENOUS EVERY 4 HOURS PRN
Status: ACTIVE | OUTPATIENT
Start: 2025-04-05

## 2025-04-05 RX ORDER — ONDANSETRON HYDROCHLORIDE 2 MG/ML
4 INJECTION, SOLUTION INTRAVENOUS EVERY 6 HOURS PRN
Status: ACTIVE | OUTPATIENT
Start: 2025-04-05

## 2025-04-05 RX ADMIN — ACETAMINOPHEN 975 MG: 325 TABLET, FILM COATED ORAL at 16:49

## 2025-04-05 RX ADMIN — SODIUM CHLORIDE, SODIUM LACTATE, POTASSIUM CHLORIDE, AND CALCIUM CHLORIDE: 600; 310; 30; 20 INJECTION, SOLUTION INTRAVENOUS at 03:25

## 2025-04-05 RX ADMIN — CEFAZOLIN 2 G: 330 INJECTION, POWDER, FOR SOLUTION INTRAMUSCULAR; INTRAVENOUS at 03:14

## 2025-04-05 RX ADMIN — ONDANSETRON 4 MG: 2 INJECTION INTRAMUSCULAR; INTRAVENOUS at 03:15

## 2025-04-05 RX ADMIN — KETOROLAC TROMETHAMINE 30 MG: 30 INJECTION, SOLUTION INTRAMUSCULAR at 04:24

## 2025-04-05 RX ADMIN — PHENYLEPHRINE-NACL IV SOLUTION 10 MG/250ML-0.9% 0.42 MCG/KG/MIN: 10-0.9/25 SOLUTION at 03:05

## 2025-04-05 RX ADMIN — OXYTOCIN 600 MILLI-UNITS/MIN: 10 INJECTION, SOLUTION INTRAMUSCULAR; INTRAVENOUS at 03:38

## 2025-04-05 RX ADMIN — MORPHINE SULFATE 0.15 MG: 0.5 INJECTION EPIDURAL; INTRATHECAL; INTRAVENOUS at 03:05

## 2025-04-05 RX ADMIN — Medication 120 MCG: at 03:16

## 2025-04-05 RX ADMIN — ACETAMINOPHEN 975 MG: 325 TABLET, FILM COATED ORAL at 22:41

## 2025-04-05 RX ADMIN — FAMOTIDINE 20 MG: 10 INJECTION INTRAVENOUS at 02:45

## 2025-04-05 RX ADMIN — NALBUPHINE HYDROCHLORIDE 5 MG: 10 INJECTION, SOLUTION INTRAMUSCULAR; INTRAVENOUS; SUBCUTANEOUS at 09:57

## 2025-04-05 RX ADMIN — KETOROLAC TROMETHAMINE 30 MG: 30 INJECTION, SOLUTION INTRAMUSCULAR; INTRAVENOUS at 10:47

## 2025-04-05 RX ADMIN — DEXAMETHASONE SODIUM PHOSPHATE 4 MG: 4 INJECTION, SOLUTION INTRA-ARTICULAR; INTRALESIONAL; INTRAMUSCULAR; INTRAVENOUS; SOFT TISSUE at 03:15

## 2025-04-05 RX ADMIN — DIPHENHYDRAMINE HYDROCHLORIDE 25 MG: 25 CAPSULE ORAL at 06:20

## 2025-04-05 RX ADMIN — LIDOCAINE HYDROCHLORIDE AND EPINEPHRINE 5 ML: 15; 5 INJECTION, SOLUTION EPIDURAL at 03:12

## 2025-04-05 RX ADMIN — BUPIVACAINE HYDROCHLORIDE IN DEXTROSE 1.4 ML: 7.5 INJECTION, SOLUTION SUBARACHNOID at 03:05

## 2025-04-05 RX ADMIN — KETOROLAC TROMETHAMINE 30 MG: 30 INJECTION, SOLUTION INTRAMUSCULAR; INTRAVENOUS at 22:42

## 2025-04-05 RX ADMIN — KETOROLAC TROMETHAMINE 30 MG: 30 INJECTION, SOLUTION INTRAMUSCULAR; INTRAVENOUS at 16:49

## 2025-04-05 RX ADMIN — FENTANYL CITRATE 15 MCG: 50 INJECTION, SOLUTION INTRAMUSCULAR; INTRAVENOUS at 03:05

## 2025-04-05 RX ADMIN — ACETAMINOPHEN 975 MG: 325 TABLET, FILM COATED ORAL at 10:47

## 2025-04-05 RX ADMIN — SODIUM CHLORIDE, SODIUM LACTATE, POTASSIUM CHLORIDE, AND CALCIUM CHLORIDE: 600; 310; 30; 20 INJECTION, SOLUTION INTRAVENOUS at 02:39

## 2025-04-05 ASSESSMENT — PAIN SCALES - GENERAL
PAINLEVEL_OUTOF10: 0 - NO PAIN

## 2025-04-05 NOTE — ANESTHESIA POSTPROCEDURE EVALUATION
Patient: Myles Tompkins    Procedure Summary       Date: 25 Room / Location: [facility] OB    Anesthesia Start: 239 Anesthesia Stop: 447    Procedure:  DELIVERY Diagnosis:       Non-reassuring fetal heart rate or rhythm affecting management of mother      (Non-reassuring fetal heart rate or rhythm affecting management of mother [O36.8390])    Surgeons: Yehuda Currie MD Responsible Provider: Dago Melo DO    Anesthesia Type: CSE ASA Status: 3            Anesthesia Type: CSE    Vitals Value Taken Time   /72 25 0443   Temp 36.1 °C (97 °F) 25 0442   Pulse 95 25 0446   Resp 14 25 0447   SpO2 99 % 25 044       Anesthesia Post Evaluation    Patient location during evaluation: floor  Patient participation: complete - patient participated  Level of consciousness: awake  Pain management: adequate  Airway patency: patent  Cardiovascular status: acceptable  Respiratory status: acceptable  Hydration status: acceptable  Postoperative Nausea and Vomiting: none  Comments: Neuraxial site assessed. No visible redness or swelling or drainage. Patient able to ambulate and move all extremities without difficulty. Able to void. No complaints of nausea/vomiting. Tolerating PO intake well. No s/sx of PDPH.          No notable events documented.

## 2025-04-05 NOTE — L&D DELIVERY NOTE
Birth Operative Report    Patient Name: Myles Tompkins  : 1995  MRN: [id number]  Age: 29 y.o.    /Para:   Gestational Age: 37w4d    Date of Surgery: 2025    Operating Room Location: * No operating room entered *    Pre-op Diagnosis:  Intrauterine Pregnancy at 37w4d  Chronic hypertension  Asthma  Hx of CS    Post-op Diagnosis:  Same    Procedure:   Repeat Low Transverse  Section    Surgery Category at [facility] Time: Confirmed Non-scheduled, Non-urgent    Surgeon:    * Yehuda Currie - Primary    Resident/Fellow/Other Assistant:   Surgeons and Role:  * No surgeons found with a matching role *    Anesthesia:  Type: CSE     Anesthesiologist: Dago Melo DO  C-AA: LISSETH Dunn  CHAYO: Jaden Seipp    Surgical Staff:  Circulator: Brittany Brunner, RN  Scrub Person: Shoshana Wellington     Preoperative Antibiotics: Ancef 2 g    Indication for Procedure:   29 y.o.  at 37w4d presented to triage and subsequently had a non-reassuring FHT with multiple consecutive prolonged late decelerations.    Informed Consent:  The risks, benefits, complications, and alternatives were discussed with the patient. The patient understood that the risks of  section include but are not limited to infection, bleeding, injury to nearby structures or organs, possible need for transfusion, and potential need for more surgery. The patient stated understanding and desired to proceed. All questions were answered. The site of surgery was properly noted and marked. The patient's identity was confirmed, and the procedure verified as a  delivery. A Time Out was held and the above information confirmed.     Findings:   Dense pre-peritoneal fascial scarring, moderate amount of intraabdominal adhesions from uterus to anterior abdominal wall. Otherwise normal appearing gravid uterus, fallopian tubes, and ovaries. Amniotic fluid Clear, Female infant in Vertex Occiput  Posterior presentation, APGARS 9 , 9 .  Birth Weight 2.87 kg.    Description of Procedure:   Patient was taken to the OR where regional anesthesia was found to be adequate.  She was then placed in the dorsal supine position with a left lateral tilt. A azul catheter was placed, SCDs were applied, and a vaginal prep was performed. A pre-procedure time out was performed. The patient was given a prophylactic dose of IV antibiotics.  She was then prepped and draped in the usual sterile fashion.     A Pfannenstiel skin incision was made with the scalpel through the skin, subcutaneous fat, and dense scar tissue to the underlying fascial layer. The fascia was incised in the midline with the scalpel and the incision was extended bilaterally using electrocautery. The superior aspect of the incision was grasped, tented up with Kocher clamps and the rectus muscle was dissected off. The muscles were divided in the midline, the peritoneum was then identified and entered bluntly and incision extended superiorly and inferiorly taking care to avoid underlying viscera.  The bladder blade was inserted.       Uterine Incision was made with the scalpel, the uterine cavity was entered, and the hysterotomy was extended cephalocaudally by stretching. The infant was delivered atraumatically, the cord was clamped and cut and infant was handed off to awaiting nursing.  A cord segment and blood sample were collected. The placenta was then expressed. The uterus was cleared of all clot and debris. An Irvin retractor was placed to help with visualization. The uterine incision was repaired using a running locked stitch of 0-Vicryl in two layers. Adequate hemostasis was noted. The Irvin retractor was removed.    The hysterotomy was again evaluated and found to be hemostatic, the underside of the fascia and bladder and the rectus muscles were also found to be hemostatic. The fascia was closed using a running stitch of 0-PDS.  The subcutaneous  layer was irrigated, small bleeders were cauterized. The skin was closed with 4-0 Monocryl.         * Yehuda Currie - Calista was present for key portions of the procedure.    Complications:           Anesthesia Record               Intraprocedure I/O Totals          Intake    LR 1800.00 mL    Oxytocin Drip 0.00 mL    The total shown is the total volume documented since Anesthesia Start was filed.    Phenylephrine Drip 0.00 mL    The total shown is the total volume documented since Anesthesia Start was filed.    Total Intake 1800 mL       Output    Urine 185 mL    Total Output 185 mL       Net    Net Volume 1615 mL            Blood products:    Blood Product Administration History       None            Uterotonics/Hemostatic Agent: IV Pitocin 30 units    Specimen:   Placenta  Delivered: 2025  3:42 AM  Appearance: Intact  Removal: Expressed    Disposition: lab    Sponge/Instrument/Needle Counts: The sponge, lap and needle counts were correct.    Patient Disposition: Patient recovering on labor and delivery in stable condition.    Additional Procedures:  None    Task Performed by: RN or PA Surgical Assistant: N/A      Kaveh Tompkins [[id number]]      Labor Events    Rupture date/time: 2025 0335  Rupture type: Artificial  Fluid color: Clear  Fluid odor: None  Labor type:  Without Labor  Labor allowed to proceed with plans for an attempted vaginal birth?: No  Complications: None       Labor Length    3rd stage: 0h 04m       Placenta    Placenta delivery date/time: 2025 0342  Placenta removal: Expressed  Placenta appearance: Intact  Placenta disposition: lab       Cord    Vessels: 3 vessels  Complications: None  Delayed cord clamping?: Yes  Cord clamped date/time: 2025 03:38:00  Cord blood disposition: Lab  Gases sent?: Yes  Stem cell collection (by provider): No       Lacerations    Episiotomy: None  Perineal laceration: None  Other lacerations?: No  Repair suture: None       Anesthesia     Method: Combined spinal-epidural       Operative Delivery    Forceps attempted?: No  Vacuum extractor attempted?: No       Shoulder Dystocia    Shoulder dystocia present?: No       Superior Delivery    Birth date/time: 2025 03:38:00  Delivery type: , Low Transverse   categorization: repeat   priority: non-scheduled, non-urgent  Complications: None       Resuscitation    Method: Tactile stimulation, Suctioning       Apgars    Living status: Living  Apgar Component Scores:  1 min.:  5 min.:  10 min.:  15 min.:  20 min.:    Skin color:  1  1       Heart rate:  2  2       Reflex irritability:  2  2       Muscle tone:  2  2       Respiratory effort:  2  2       Total:  9  9       Apgars assigned by: ANALY STOCKTON RN       Delivery Providers    Delivering clinician: Yehuda Currie MD   Provider Role    Brittany Brunner, RN Delivery Nurse    Analy Stockton, RN Nursery Nurse    Alem Lincoln MD Resident

## 2025-04-05 NOTE — H&P
OB Admission H&P    Assessment/Plan    Myles Tompkins is a 29 y.o.  at 37w3d, MARLENE: 2025, by Last Menstrual Period, who presents to triage with abd pain and headache, now admitted for NRFHT.     NRFHT for rCS  - Prolonged decels with ctx, q15min  - Discussed that given NRFHT at term, recommend delivery at this time  - Risks and benefits of  discussed, patient understands the risks and is agreeable  - Hx Csx1, pt desires rCS  - Hgb 12.6 on admission, T&Cx1  - NPO since 1230 today  - PPBC: pt unsure about tubal ligation, discussed option for interval tubal, will defer at this time; desires POPs    Headache  cHTN  - Reports feeling like her usual migraines  - Resolved with Tylenol, Benadryl, Reglan and imitrex  - Bps normotensive  - not on meds    Abd/back pain  - Abd pain now resolved, reports occurs with standing for long periods of time; likely round ligament pain  - Back pain improved with tylenol    Asthma  - mild intermittent, rare albuterol use  - no hemabate    Fetal status  -Fetal monitoring reassuring  -Good fetal movement  -Up to date on prenatal care  -Continue routine prenatal care    Admit for delivery in s/o NRFHT at term.    Discussed plan and reviewed with: Dr. Kush Lincoln MD PGY-2  Obstetrics & Gynecology      Pregnancy Problems (from 24 to present)       Problem Noted Diagnosed Resolved    Positive GBS test 3/31/2025 by Laura Doe V, APRN-CNM, APRN-CNP  No    Priority:  Medium       Supervision of high risk pregnancy, antepartum ([facility]) 3/13/2025 by Regine Plaza MD  No    Priority:  Medium       Suspected fetal abnormality affecting management of mother ([facility]) 2/3/2025 by Laura Mckeon MD  No    Priority:  Medium       Overview Signed 2/10/2025  8:40 AM by Tr Gonzalez MD     A fetal echocardiogram for incomplete cardiac views on obstetric ultrasound, demonstrated grossly normal fetal cardiac anatomy         Obesity in pregnancy ([facility])  2024 by ROCKY Lawson  No    Priority:  Medium       Overview Addendum 2/10/2025  9:09 AM by Tr Gonzalez MD     BMI = 45.75 at NOB  Fetal surveillance BMI >40 at NOB:  BPP or NST weekly 34 wks to delivery  Growth scans starting at 28 weeks for h/o FGR     Timing of delivery: 39 0/7 - 39 6/7 wks  *BMI >= 50 at any time in pregnancy: Deliver at Level 4         Uterine scar from previous  delivery affecting pregnancy ([facility]) 2024 by ROCKY Lawson  No    Priority:  Medium       Overview Addendum 2/10/2025  8:52 AM by Tr Gonzalez MD     PLTCS for breech presentation  Planning rLTCS         Previous baby with fetal growth restriction 2024 by ROCKY Lawson  No    Priority:  Medium       Overview Addendum 3/30/2025 11:53 AM by ROCKY Patterson     Growth US at 28, 32 and 36 wks- (30% AC, EFW 32%)         Herpes simplex type 2 (HSV-2) infection affecting pregnancy, antepartum ([facility]) 2024 by ROCKY Lawson  No    Priority:  Medium       Overview Addendum 3/20/2025  8:39 AM by Jeanine Negrete MD     Diagnosed in 2019  On valtrex         37 weeks gestation of pregnancy ([facility]) 2024 by ROCKY Lawson  No    Priority:  Medium       Overview Addendum 4/3/2025  9:54 AM by Juice Bates MD     Desired provider in labor: [] CNM  [x] Physician  - planning rCS  [x] Blood Products: [x] Yes, accepts [] No, needs counseling  [x] Initial BMI: 45.75   [x] Prenatal Labs: WNL  [x] Cervical Cancer Screening up to date: 24   [x] Rh status: O+  [x] Genetic Screening:  declined   [x] NT US: (11-13 wks): WNL  [x] Baby ASA (if indicated): patient declines  [x] Pregnancy dated by: LMP c/w 13w US    [x] Anatomy US (19-20 wks): fetal cardiac views not completed, fetal echo scheduled for 2/3  [x] Federal Sterilization consent signed (if indicated): signed   [x] 1hr GCT at 24-28wks:  wnl   [x] Fetal Surveillance (if indicated): Growth US at 28/32/36 weeks in setting of prior FGR; BPP/NST weekly starting at 34 weeks  [x] Tdap (27-32 wks, may be given up to 36 wks if initial window missed): administered on 1/30  [x] Flu Vaccine: Oct 2024 per patient     [] Breastfeeding: Not breastfeeding  [x] Postpartum Birth control method: desires ppTL (consent signed 2/24), planning to visit [email or web address] to review backup plans   [x] GBS at 36 - 37 wks: GBS Positive 3/28  [x] Mode of delivery ( anticipated ): rCS- scheduled 4/15           Chronic hypertension affecting pregnancy ([facility])  by ROCKY Patterson  No    Priority:  Medium       Overview Addendum 11/11/2024 10:24 AM by Ellis Hutchins MD     No meds  Discussed ASA; patient declines due to previous lightheadedness in prior pregnancy   Baseline HELLP labs WNL at new OB, P/c ratio 0.07          Asthma affecting pregnancy, antepartum ([facility]) 1995 by Phu Guzman MD  No    Priority:  Medium       Overview Addendum 3/20/2025  8:40 AM by Jeanine Negrete MD     No hospitalizations or intubations per patient's recollection   mometasone and albuterol prn          Request for sterilization 3/6/2025 by Jeanine Negrete MD  No    Overview Signed 3/28/2025 12:14 PM by ROCKY Castro, CELIA-CNP     Consent signed 2/24/25, scanned into Media tab                 Subjective   Patient reports headache starting today. She states it feels like her usual migraines which she gets outside of pregnancy. She did not take any medication for it. Rates it 8/10. Denies vision changes, chest pain, shortness of breath, or RUQ pain. Reports abd pain pain that starts at her fundus and radiates down to her pelvis that occurs when she is standing for long periods of time. Currently states it has resolves. Reports mild midline low back pain.     States has not been checking Bps at home.     Good fetal movement.  Denies vaginal bleeding., Denies  contractions., Denies leaking of fluid.      Prenatal Provider [place]    Pregnancy n/f:  - hx of CS for breech  - asthma, rare albuterol use  - cHTN, no meds    OB History    Para Term  AB Living   2 1 1 0 0 1   SAB IAB Ectopic Multiple Live Births   0 0 0 0 1      # Outcome Date GA Lbr Ankit/2nd Weight Sex Type Anes PTL Lv   2 Current            1 Term 20   2.466 kg M CS-LTranv EPI  CLARISA      Complications: IUGR (intrauterine growth restriction) affecting care of mother ([facility]), Breech birth ([facility])       Past Surgical History:   Procedure Laterality Date     SECTION, LOW TRANSVERSE         Social History     Tobacco Use    Smoking status: Never    Smokeless tobacco: Never   Substance Use Topics    Alcohol use: Never       Allergies   Allergen Reactions    Bee Pollen Runny nose       Medications Prior to Admission   Medication Sig Dispense Refill Last Dose/Taking    mometasone (Asmanex Twisthaler) 220 mcg/ actuation (60)i inhaler Inhale 2 puffs once daily. 1 each 3 4/3/2025    prenatal vitamin, iron-folic, 27 mg iron-800 mcg folic acid tablet Take 1 tablet by mouth once daily. 90 tablet 3 4/3/2025    valACYclovir (Valtrex) 500 mg tablet Take 1 tablet (500 mg) by mouth once daily. 60 tablet 5 4/3/2025    albuterol (Ventolin HFA) 90 mcg/actuation inhaler Inhale 1 puff every 6 hours if needed for wheezing. 18 g 11     blood pressure kit-extra large kit 1 each 2 times a day. 1 kit 0     pantoprazole (ProtoNix) 20 mg EC tablet Take 1 tablet (20 mg) by mouth once daily in the morning. Take before meals.   Unknown     Objective     Last Vitals  Temp Pulse Resp BP MAP O2 Sat   36.2 °C (97.2 °F) 105 18 137/75 99 97 %     Blood Pressures         2025          BP: 129/78 137/75               Physical Exam  General: NAD, mood appropriate  Cardiopulmonary: warm and well perfused, breathing comfortably on room air  Abdomen: Gravid, non-tender  Extremities: Symmetric  Vulva:  no lesions noted   Cervix: Closed /0 /Floating     Chaperone Present: Yes.  Chaperone Name/Title: Chitra Lopes RN  Examination Chaperoned: Gynecological Exam     Fetal Monitoring  Baseline: 145 bpm, Variability: moderate,  Accelerations: present and Decelerations: none  Uterine Activity: Irregular contractions  Interpretation: Reactive    Bedside ultrasound: No

## 2025-04-05 NOTE — ANESTHESIA PROCEDURE NOTES
CSE Block    Patient location during procedure: OR  Start time: 4/5/2025 2:49 AM  End time: 4/5/2025 3:06 AM  Reason for block: primary anesthetic}  Staffing  Performed: KAREN   Authorized by: Dago Melo DO    Performed by: LISSETH Dunn    Preanesthetic Checklist  Completed: patient identified, IV checked, risks and benefits discussed, surgical consent, monitors and equipment checked, pre-op evaluation, timeout performed and sterile techniques followed  Block Timeout  RN/Licensed healthcare professional reads aloud to the Anesthesia provider and entire team: Patient identity, procedure with side and site, patient position, and as applicable the availability of implants/special equipment/special requirements.  Patient on coagulant treatment: no  Timeout performed at: 4/5/2025 2:49 AM    CSE Block  Patient position: sitting  Prep: ChloraPrep  Sterility prep: cap, drape, gloves and mask  Sedation level: no sedation  Patient monitoring: heart rate, continuous pulse oximetry and blood pressure  Approach: midline  Local numbing: lidocaine 1% to skin and subcutaneous tissues  Vertebral space: lumbar  Guidance: landmark technique    Epidural Needle  JOHN technique: saline  Needle type: Tuohy   Needle gauge: 17 G  Needle insertion depth: 7.5 cm  Spinal Needle  Needle type: pencil-point   Needle gauge: 24G  Free flow CSF: yes  Epidural Catheter  Catheter type: multi-orifice  Catheter size: 19 G  Catheter at skin depth: 11.5 cm  Catheter securement method: clear occlusive dressing and liquid medical adhesive              Assessment  Number of attempts: 2  Additional Notes  1 attempt MSA2, 1 attempt CAA

## 2025-04-05 NOTE — ANESTHESIA PREPROCEDURE EVALUATION
Patient: Myles Tompkins    Evaluation Method: In-person visit    Procedure Information    Date: 25  Procedure: Procedure Not Yet Scheduled         Relevant Problems   Anesthesia  C/S x1 - neuraxaial      Cardiac   (+) Chronic hypertension affecting pregnancy ([facility])      Pulmonary   (+) Asthma affecting pregnancy, antepartum ([facility]) (Inhaler use PRN with weather changes)      Neuro (within normal limits)      GI   (+) Gastroesophageal reflux disease      /Renal (within normal limits)      Liver (within normal limits)      Endocrine   (+) Obesity in pregnancy ([facility])      Hematology (within normal limits)      Musculoskeletal (within normal limits)      ID   (+) Herpes simplex type 2 (HSV-2) infection affecting pregnancy, antepartum ([facility])      GYN   (+) 37 weeks gestation of pregnancy ([facility])   (+) Supervision of high risk pregnancy, antepartum ([facility])       Clinical information reviewed:   Tobacco  Allergies  Meds   Med Hx  Surg Hx  OB Status  Fam Hx  Soc   Hx        NPO Detail:  NPO/Void Status  Date of Last Liquid: 25  Time of Last Liquid: 193  Date of Last Solid: 25  Time of Last Solid: 1230         OB/Gyn Evaluation    Present Pregnancy    (+) , previous  section, hypertensive disorder of pregnancy - preeclampsia   Obstetric History                Physical Exam    Airway  Mallampati: I  Neck ROM: full     Cardiovascular    Dental    Pulmonary    Abdominal            Anesthesia Plan    History of general anesthesia?: no  History of complications of general anesthesia?: no    ASA 3     CSE   (CSE and GA R/B discussed.  Questions welcomed.  Pt agrees with plan.)  Anesthetic plan and risks discussed with patient.  Use of blood products discussed with patient who consented to blood products.    Plan discussed with CAA and attending.

## 2025-04-06 VITALS
WEIGHT: 260.14 LBS | SYSTOLIC BLOOD PRESSURE: 125 MMHG | DIASTOLIC BLOOD PRESSURE: 76 MMHG | RESPIRATION RATE: 16 BRPM | BODY MASS INDEX: 49.12 KG/M2 | TEMPERATURE: 97.5 F | HEART RATE: 90 BPM | OXYGEN SATURATION: 96 % | HEIGHT: 61 IN

## 2025-04-06 LAB
ERYTHROCYTE [DISTWIDTH] IN BLOOD BY AUTOMATED COUNT: 13.8 % (ref 11.5–14.5)
HCT VFR BLD AUTO: 32.6 % (ref 36–46)
HGB BLD-MCNC: 10.5 G/DL (ref 12–16)
MCH RBC QN AUTO: 32.4 PG (ref 26–34)
MCHC RBC AUTO-ENTMCNC: 32.2 G/DL (ref 32–36)
MCV RBC AUTO: 101 FL (ref 80–100)
NRBC BLD-RTO: 0 /100 WBCS (ref 0–0)
PLATELET # BLD AUTO: 265 X10*3/UL (ref 150–450)
RBC # BLD AUTO: 3.24 X10*6/UL (ref 4–5.2)
WBC # BLD AUTO: 12.5 X10*3/UL (ref 4.4–11.3)

## 2025-04-06 PROCEDURE — 1100000001 HC PRIVATE ROOM DAILY

## 2025-04-06 PROCEDURE — 2500000001 HC RX 250 WO HCPCS SELF ADMINISTERED DRUGS (ALT 637 FOR MEDICARE OP): Mod: SE | Performed by: STUDENT IN AN ORGANIZED HEALTH CARE EDUCATION/TRAINING PROGRAM

## 2025-04-06 PROCEDURE — 85027 COMPLETE CBC AUTOMATED: CPT | Performed by: STUDENT IN AN ORGANIZED HEALTH CARE EDUCATION/TRAINING PROGRAM

## 2025-04-06 PROCEDURE — 99231 SBSQ HOSP IP/OBS SF/LOW 25: CPT

## 2025-04-06 PROCEDURE — 2500000004 HC RX 250 GENERAL PHARMACY W/ HCPCS (ALT 636 FOR OP/ED): Mod: SE | Performed by: STUDENT IN AN ORGANIZED HEALTH CARE EDUCATION/TRAINING PROGRAM

## 2025-04-06 RX ADMIN — ACETAMINOPHEN 975 MG: 325 TABLET, FILM COATED ORAL at 04:58

## 2025-04-06 RX ADMIN — IBUPROFEN 600 MG: 600 TABLET ORAL at 17:44

## 2025-04-06 RX ADMIN — IBUPROFEN 600 MG: 600 TABLET ORAL at 04:58

## 2025-04-06 RX ADMIN — ACETAMINOPHEN 975 MG: 325 TABLET, FILM COATED ORAL at 11:44

## 2025-04-06 RX ADMIN — ACETAMINOPHEN 975 MG: 325 TABLET, FILM COATED ORAL at 17:44

## 2025-04-06 RX ADMIN — ENOXAPARIN SODIUM 60 MG: 100 INJECTION SUBCUTANEOUS at 11:44

## 2025-04-06 RX ADMIN — IBUPROFEN 600 MG: 600 TABLET ORAL at 11:44

## 2025-04-06 ASSESSMENT — PAIN SCALES - GENERAL
PAINLEVEL_OUTOF10: 0 - NO PAIN
PAINLEVEL_OUTOF10: 4
PAINLEVEL_OUTOF10: 4
PAINLEVEL_OUTOF10: 0 - NO PAIN

## 2025-04-06 ASSESSMENT — PAIN DESCRIPTION - DESCRIPTORS
DESCRIPTORS: ACHING;BURNING;CRAMPING
DESCRIPTORS: ACHING;BURNING;CRAMPING

## 2025-04-06 ASSESSMENT — PAIN DESCRIPTION - LOCATION: LOCATION: ABDOMEN

## 2025-04-06 NOTE — CARE PLAN
The patient's goals for the shift include rest today    The clinical goals for the shift include stable vital signs this shift    Problem: Pain - Adult  Goal: Verbalizes/displays adequate comfort level or baseline comfort level  Outcome: Progressing     Problem: Safety - Adult  Goal: Free from fall injury  Outcome: Progressing     Problem: Postpartum  Goal: Experiences normal postpartum course  Outcome: Progressing  Goal: Appropriate maternal -  bonding  Outcome: Progressing  Goal: Establish and maintain infant feeding pattern for adequate nutrition  Outcome: Progressing  Goal: Incisions, wounds, or drain sites healing without S/S of infection  Outcome: Progressing  Goal: No s/sx infection  Outcome: Progressing  Goal: No s/sx of hemorrhage  Outcome: Progressing  Goal: Minimal s/sx of HDP and BP<160/110  Outcome: Progressing     Problem: Discharge Planning  Goal: Discharge to home or other facility with appropriate resources  Outcome: Progressing       VSS, voiding, passing flatus, pain well controlled, bleeding minimal, bottle feeding infant who is under bili lights at bedside.

## 2025-04-06 NOTE — PROGRESS NOTES
Postpartum Progress Note    Assessment/Plan   Myles Tompkins is a 29 y.o., , who delivered at 37w4d gestation    Now PPD#1 s/p , Low Transverse on 2025  - Continue routine postpartum care  - Pain well controlled on po medications  - DVT risk score DVT Score (IF A SCORE IS NOT CALCULATING, MUST SELECT A BMI TO COMPLETE): 7 , ppx with SCDs, ambulation, and lovenox  - RH positive, rhogam not indicated  - Hgb:   Results from last 7 days   Lab Units 25  0653 25  2217   HEMOGLOBIN g/dL 10.5* 12.6        cHTN  - Normotensive on no meds  - HELLP labs negative  - BP cuff for home  - Asymptomatic  - Reviewed 3 day stay for BP monitoring  - Reviewed signs of elevated BP, appropriate BP parameters and how to monitor BP at home     Maternal Well-Being  - Vitals stable  - All questions and concerns address    [place]room Feeding  - Breastfeeding/pumping encouraged  - Lactation consult prn    Contraception  - POPs  - Education provided    Dispo  - Anticipate d/c on PPD #3 if meeting all postpartum milestones and pending BP control  - Follow-up in 2-5 days for BP check  - Follow-up in 1-2wks for incision check  - Follow-up in 4-6wks with primary SPRING Underwood APRN-CNP     Assessment & Plan  Non-reassuring fetal heart rate or rhythm affecting management of mother    Gastroesophageal reflux disease    Pregnancy Problems (from 24 to present)       Problem Noted Diagnosed Resolved    Positive GBS test 3/31/2025 by Laura Doe V, APRN-CNTWYLA, APRN-CNP  No    Priority:  Medium       Supervision of high risk pregnancy, antepartum ([facility]) 3/13/2025 by Regine Plaza MD  No    Priority:  Medium       Suspected fetal abnormality affecting management of mother ([facility]) 2/3/2025 by Laura Mckeon MD  No    Priority:  Medium       Overview Signed 2/10/2025  8:40 AM by Tr Gonzalez MD     A fetal echocardiogram for incomplete cardiac views on obstetric ultrasound, demonstrated grossly  normal fetal cardiac anatomy         Obesity in pregnancy ([facility]) 2024 by ROCKY Lawson  No    Priority:  Medium       Overview Addendum 2/10/2025  9:09 AM by Tr Gonzalez MD     BMI = 45.75 at NOB  Fetal surveillance BMI >40 at NOB:  BPP or NST weekly 34 wks to delivery  Growth scans starting at 28 weeks for h/o FGR     Timing of delivery: 39 0/7 - 39 6/7 wks  *BMI >= 50 at any time in pregnancy: Deliver at Level 4         Uterine scar from previous  delivery affecting pregnancy ([facility]) 2024 by ROCKY Lawson  No    Priority:  Medium       Overview Addendum 2/10/2025  8:52 AM by Tr Gonzalez MD     PLTCS for breech presentation  Planning rLTCS         Previous baby with fetal growth restriction 2024 by ROCKY Lawson  No    Priority:  Medium       Overview Addendum 3/30/2025 11:53 AM by ROCKY Patterson     Growth US at 28, 32 and 36 wks- (30% AC, EFW 32%)         Herpes simplex type 2 (HSV-2) infection affecting pregnancy, antepartum ([facility]) 2024 by ROCKY Lawson  No    Priority:  Medium       Overview Addendum 3/20/2025  8:39 AM by Jeanine Negrete MD     Diagnosed in 2019  On valtrex         37 weeks gestation of pregnancy ([facility]) 2024 by ROCKY Lawson  No    Priority:  Medium       Overview Addendum 4/3/2025  9:54 AM by Juice Bates MD     Desired provider in labor: [] CNM  [x] Physician  - planning rCS  [x] Blood Products: [x] Yes, accepts [] No, needs counseling  [x] Initial BMI: 45.75   [x] Prenatal Labs: WNL  [x] Cervical Cancer Screening up to date: 24   [x] Rh status: O+  [x] Genetic Screening:  declined   [x] NT US: (11-13 wks): WNL  [x] Baby ASA (if indicated): patient declines  [x] Pregnancy dated by: LMP c/w 13w US    [x] Anatomy US (19-20 wks): fetal cardiac views not completed, fetal echo scheduled for 2/3  [x] Federal Sterilization  consent signed (if indicated): signed   [x] 1hr GCT at 24-28wks: wnl   [x] Fetal Surveillance (if indicated): Growth US at 28/32/36 weeks in setting of prior FGR; BPP/NST weekly starting at 34 weeks  [x] Tdap (27-32 wks, may be given up to 36 wks if initial window missed): administered on   [x] Flu Vaccine: Oct 2024 per patient     [] Breastfeeding: Not breastfeeding  [x] Postpartum Birth control method: desires ppTL (consent signed ), planning to visit [email or web address] to review backup plans   [x] GBS at 36 - 37 wks: GBS Positive 3/28  [x] Mode of delivery ( anticipated ): rCS- scheduled 4/15           Chronic hypertension affecting pregnancy ([facility])  by ROCKY Patterson  No    Priority:  Medium       Overview Addendum 2024 10:24 AM by Ellis Hutchins MD     No meds  Discussed ASA; patient declines due to previous lightheadedness in prior pregnancy   Baseline HELLP labs WNL at new OB, P/c ratio 0.07          Asthma affecting pregnancy, antepartum ([facility]) 1995 by Phu Guzman MD  No    Priority:  Medium       Overview Addendum 3/20/2025  8:40 AM by Jeanine Negrete MD     No hospitalizations or intubations per patient's recollection   mometasone and albuterol prn          Request for sterilization 3/6/2025 by Jeanine Negrete MD  No    Overview Signed 3/28/2025 12:14 PM by CELIA Castro-CNTWYLA, APRN-CNP     Consent signed 25, scanned into Media tab                 Subjective     Myles Tompkins is PPD#1 s/p  who reports feeling overall well.  No acute events overnight.  Voiding spontaneously, passing flatus.  Pain well controlled on PO meds.  Light lochia. Tolerating diet.  Denies HA, CP, SOB, RUQ pain, vision changes or N/V. Denies dizziness, lightheadedness, LOC, or uncontrolled bleeding.    Objective   Allergies:   Bee pollen         Last Vitals:  Temp Pulse Resp BP MAP Pulse Ox   36.6 °C (97.9 °F) 90 16 111/76   95 %     Vitals Min/Max Last  24 Hours:  Temp  Min: 36.4 °C (97.5 °F)  Max: 36.9 °C (98.4 °F)  Pulse  Min: 79  Max: 90  Resp  Min: 16  Max: 20  BP  Min: 93/67  Max: 118/76    Intake/Output:     Intake/Output Summary (Last 24 hours) at 4/6/2025 1611  Last data filed at 4/6/2025 0243  Gross per 24 hour   Intake 500 ml   Output 760 ml   Net -260 ml       Physical Exam:  General: examination reveals a well developed, well nourished, female, in no acute distress. She is alert and cooperative.  HEENT: external ears normal. Nose normal, no erythema or discharge.  Neck: supple, no significant adenopathy  Lungs: breathing even and unlabored, lungs clear  Cardiac: warm and well perfused, heart rate regular  Fundus: firm and below umbilicus, lochia light  Abdominal: soft, non-tender, non-distended, bowel sounds active  Extremities: no redness or tenderness in the calves or thighs, edema: non-pitting BLE  Neurological: alert, oriented, normal speech, no focal findings or movement disorder noted.  Psychological: awake and alert; oriented to person, place, and time.  Skin: incision clean, dry, intact, well approximated, no redness, drainage, or warmth     Lab Data:  Labs in chart were reviewed.

## 2025-04-06 NOTE — ANESTHESIA POSTPROCEDURE EVALUATION
Patient: Myles Tompkins    Procedure Summary       Date: 25 Room / Location: [facility]    Anesthesia Start: 239 Anesthesia Stop: 447    Procedure:  DELIVERY Diagnosis:       Non-reassuring fetal heart rate or rhythm affecting management of mother      (Non-reassuring fetal heart rate or rhythm affecting management of mother [O36.8390])    Surgeons: Yehuda Currie MD Responsible Provider: Dago Melo DO    Anesthesia Type: CSE ASA Status: 3            Anesthesia Type: CSE    Vitals Value Taken Time   /64 25 0628   Temp - 25 1435   Pulse 90 25 0628   Resp 18 25 0459   SpO2 96 % 25 06       Anesthesia Post Evaluation    Patient location during evaluation: floor  Patient participation: complete - patient participated  Level of consciousness: awake and alert  Pain management: adequate  Airway patency: patent  Cardiovascular status: acceptable and hemodynamically stable  Respiratory status: acceptable  Hydration status: acceptable  Postoperative Nausea and Vomiting: none        No notable events documented.    Myles Tompkins is a 29 y.o., , who had a , Low Transverse delivery on 2025 at 37w4d and is now POD1.    She had Neuraxial Anesthesia without immediate complications noted.       Pain well controlled    Vitals:    25 1245   BP: 114/79   Pulse: 85   Resp: 17   Temp: 36.8 °C (98.2 °F)   SpO2: 98%       Neuraxial site assessed. No visible redness or swelling or drainage. Patient able to ambulate and move all extremities without difficulty. Able to void. No complaints of nausea/vomiting. Tolerating PO intake well. No s/sx of PDPH.     Anesthesia will sign off     Wisam Decker MD

## 2025-04-07 ENCOUNTER — PHARMACY VISIT (OUTPATIENT)
Dept: PHARMACY | Facility: CLINIC | Age: 30
End: 2025-04-07
Payer: MEDICAID

## 2025-04-07 LAB
BLOOD EXPIRATION DATE: NORMAL
DISPENSE STATUS: NORMAL
PRODUCT BLOOD TYPE: 5100
PRODUCT CODE: NORMAL
UNIT ABO: NORMAL
UNIT NUMBER: NORMAL
UNIT RH: NORMAL
UNIT VOLUME: 350
XM INTEP: NORMAL

## 2025-04-07 PROCEDURE — 1100000001 HC PRIVATE ROOM DAILY

## 2025-04-07 PROCEDURE — 2500000001 HC RX 250 WO HCPCS SELF ADMINISTERED DRUGS (ALT 637 FOR MEDICARE OP): Mod: SE | Performed by: STUDENT IN AN ORGANIZED HEALTH CARE EDUCATION/TRAINING PROGRAM

## 2025-04-07 PROCEDURE — 2500000004 HC RX 250 GENERAL PHARMACY W/ HCPCS (ALT 636 FOR OP/ED): Mod: SE | Performed by: STUDENT IN AN ORGANIZED HEALTH CARE EDUCATION/TRAINING PROGRAM

## 2025-04-07 PROCEDURE — RXMED WILLOW AMBULATORY MEDICATION CHARGE

## 2025-04-07 RX ORDER — POLYETHYLENE GLYCOL 3350 17 G/17G
17 POWDER, FOR SOLUTION ORAL DAILY
Qty: 510 G | Refills: 0 | Status: SHIPPED | OUTPATIENT
Start: 2025-04-07

## 2025-04-07 RX ORDER — IBUPROFEN 600 MG/1
600 TABLET ORAL EVERY 6 HOURS
Qty: 120 TABLET | Refills: 0 | Status: SHIPPED | OUTPATIENT
Start: 2025-04-07 | End: 2025-05-07

## 2025-04-07 RX ORDER — NORETHINDRONE 0.35 MG/1
1 TABLET ORAL DAILY
Qty: 28 TABLET | Refills: 2 | Status: SHIPPED | OUTPATIENT
Start: 2025-04-07 | End: 2025-06-30

## 2025-04-07 RX ORDER — ACETAMINOPHEN 325 MG/1
975 TABLET ORAL EVERY 6 HOURS
Qty: 360 TABLET | Refills: 0 | Status: SHIPPED | OUTPATIENT
Start: 2025-04-07 | End: 2025-05-07

## 2025-04-07 RX ADMIN — OXYCODONE HYDROCHLORIDE 10 MG: 10 TABLET ORAL at 01:23

## 2025-04-07 RX ADMIN — SIMETHICONE 80 MG: 80 TABLET, CHEWABLE ORAL at 22:30

## 2025-04-07 RX ADMIN — ACETAMINOPHEN 975 MG: 325 TABLET, FILM COATED ORAL at 22:30

## 2025-04-07 RX ADMIN — ACETAMINOPHEN 975 MG: 325 TABLET, FILM COATED ORAL at 16:50

## 2025-04-07 RX ADMIN — OXYCODONE HYDROCHLORIDE 5 MG: 5 TABLET ORAL at 23:05

## 2025-04-07 RX ADMIN — IBUPROFEN 600 MG: 600 TABLET ORAL at 04:48

## 2025-04-07 RX ADMIN — IBUPROFEN 600 MG: 600 TABLET ORAL at 00:17

## 2025-04-07 RX ADMIN — IBUPROFEN 600 MG: 600 TABLET ORAL at 16:50

## 2025-04-07 RX ADMIN — ACETAMINOPHEN 975 MG: 325 TABLET, FILM COATED ORAL at 00:17

## 2025-04-07 RX ADMIN — IBUPROFEN 600 MG: 600 TABLET ORAL at 22:22

## 2025-04-07 RX ADMIN — IBUPROFEN 600 MG: 600 TABLET ORAL at 10:46

## 2025-04-07 RX ADMIN — ACETAMINOPHEN 975 MG: 325 TABLET, FILM COATED ORAL at 04:48

## 2025-04-07 RX ADMIN — POLYETHYLENE GLYCOL 3350 17 G: 17 POWDER, FOR SOLUTION ORAL at 20:09

## 2025-04-07 RX ADMIN — ENOXAPARIN SODIUM 60 MG: 100 INJECTION SUBCUTANEOUS at 12:08

## 2025-04-07 RX ADMIN — ACETAMINOPHEN 975 MG: 325 TABLET, FILM COATED ORAL at 10:46

## 2025-04-07 ASSESSMENT — PAIN SCALES - GENERAL
PAINLEVEL_OUTOF10: 0 - NO PAIN
PAINLEVEL_OUTOF10: 4
PAINLEVEL_OUTOF10: 2
PAINLEVEL_OUTOF10: 5 - MODERATE PAIN
PAINLEVEL_OUTOF10: 8
PAINLEVEL_OUTOF10: 3
PAINLEVEL_OUTOF10: 9
PAINLEVEL_OUTOF10: 8
PAINLEVEL_OUTOF10: 6
PAINLEVEL_OUTOF10: 9

## 2025-04-07 ASSESSMENT — PAIN DESCRIPTION - DESCRIPTORS
DESCRIPTORS: CRAMPING;OTHER (COMMENT)
DESCRIPTORS: CRAMPING
DESCRIPTORS: PATIENT UNABLE TO DESCRIBE
DESCRIPTORS: PATIENT UNABLE TO DESCRIBE
DESCRIPTORS: CRAMPING;ACHING
DESCRIPTORS: CRAMPING
DESCRIPTORS: CRAMPING

## 2025-04-07 ASSESSMENT — PAIN DESCRIPTION - LOCATION
LOCATION: ABDOMEN
LOCATION: ABDOMEN
LOCATION: CHEST

## 2025-04-07 NOTE — CARE PLAN
The patient's goals for the shift include rest today    The clinical goals for the shift include VSS    Continues to progress towards the following goals:      Problem: Pain - Adult  Goal: Verbalizes/displays adequate comfort level or baseline comfort level  Outcome: Progressing     Problem: Safety - Adult  Goal: Free from fall injury  Outcome: Progressing     Problem: Postpartum  Goal: Experiences normal postpartum course  Outcome: Progressing  Goal: Appropriate maternal -  bonding  Outcome: Progressing  Goal: Establish and maintain infant feeding pattern for adequate nutrition  Outcome: Progressing  Goal: Incisions, wounds, or drain sites healing without S/S of infection  Outcome: Progressing  Goal: No s/sx infection  Outcome: Progressing  Goal: No s/sx of hemorrhage  Outcome: Progressing  Goal: Minimal s/sx of HDP and BP<160/110  Outcome: Progressing     Problem: Discharge Planning  Goal: Discharge to home or other facility with appropriate resources  Outcome: Progressing     Was able to rest and bond with baby

## 2025-04-07 NOTE — PROGRESS NOTES
Postpartum Progress Note    Assessment/Plan     Myles Tompkins is a 29 y.o., , who initially presented for HA and abd pain. She had a repeat , Low Transverse delivery for NRFHT on 2025 at 37w4d and is now POD2.      Post-op , Low Transverse   - continue routine postoperative care  - pain well controlled on ERAS protocol  - Hg  12.6 >  > POD#1 10.5  - asx  - continue to monitor for s/sx of anemia  Results from last 7 days   Lab Units 25  0653 25  2217   HEMOGLOBIN g/dL 10.5* 12.6   - Pt's blood type is O POS. The baby's blood type is B POS. Rhogam is not indicated.  - DVT Score (IF A SCORE IS NOT CALCULATING, MUST SELECT A BMI TO COMPLETE): 7 SCDs and enoxaparin prophylactic dose daily while inpatient      cHTN  - no meds  - asx  - HELLP labs negative on admission  - normotensive in PP period  - discussed 3 day stay and pt states understanding   - BP cuff for home at discharge, to monitor BID    Asthma  - rarely using Albuterol PRN    Contraception  POPs    Maternal Well-Being  - emotional support provided  - bonding with infant  -  feeding: breastfeeding/pumping encouraged; lactation consult prn   - pregnancy n/f   -- h/o CS for breech presentation of fetus    Dispo  - anticipate d/c on POD #3 if meeting all post-op and postpartum milestones    - The signs and symptoms of PEC were reviewed with the patient, including unrelenting headache, vision changes/blurred vision, and RUQ pain  - BP cuff for home for checking BP twice a day  - Pt instructed to call primary OB if SBP > 160 or DBP > 110 or if development of PEC symptoms     - On discharge, follow up with primary OB in:       - 2-5 days for BP check       - 2 weeks for incision check       - 4-6 weeks for post-partum visit       Assessment & Plan  Non-reassuring fetal heart rate or rhythm affecting management of mother    Gastroesophageal reflux disease        Subjective   Her pain is well controlled  with current medications  She is passing flatus  She is ambulating independently  She is tolerating a Adult diet Regular  She is voiding spontaneously  She reports no breast or nursing problems  She denies emotional concerns today     Denies CP, SOB, RUQ pain, HA, scotoma, vision changes      Objective   Last Vitals:  Temp Pulse Resp BP MAP Pulse Ox   36.7 °C (98.1 °F) 95 18 117/63   98 %       Physical Exam:  General: well appearing, well nourished, postpartum  Obstetric: fundus firm below umbilicus, lochia light  Skin: Warm, dry; no rashes/lesions/erythema  Abdominal: incision BENOIT with no s/sx of drainage, bleeding, infection  Neuro: A/Ox3, no gross motor deficit   GI: no distension, appropriately tender, soft  Respiratory: Even and unlabored on RA  Cardiovascular: Trace BLE edema; No erythema, warmth  Psych: appropriate mood and affect      Lab Data:  Lab Results   Component Value Date    WBC 12.5 (H) 04/06/2025    HGB 10.5 (L) 04/06/2025    HCT 32.6 (L) 04/06/2025     04/06/2025

## 2025-04-07 NOTE — CARE PLAN
The patient's goals for the shift include Rest & bond with     The clinical goals for the shift include Adequate pain control, delmy VS (BP<160/110),  site free from S/S of infection, normal transition to post partum    Problem: Pain - Adult  Goal: Verbalizes/displays adequate comfort level or baseline comfort level  Outcome: Progressing     Problem: Safety - Adult  Goal: Free from fall injury  Outcome: Progressing     Problem: Postpartum  Goal: Experiences normal postpartum course  Outcome: Progressing  Goal: Appropriate maternal -  bonding  Outcome: Progressing  Goal: Establish and maintain infant feeding pattern for adequate nutrition  Outcome: Progressing  Goal: Incisions, wounds, or drain sites healing without S/S of infection  Outcome: Progressing  Goal: No s/sx infection  Outcome: Progressing  Goal: No s/sx of hemorrhage  Outcome: Progressing  Goal: Minimal s/sx of HDP and BP<160/110  Outcome: Progressing    VS stable (BP<160/110), voiding, pain well controlled, minimal swelling/bleeding,  site free from S/S of infection, formula feeding

## 2025-04-08 ENCOUNTER — PHARMACY VISIT (OUTPATIENT)
Dept: PHARMACY | Facility: CLINIC | Age: 30
End: 2025-04-08
Payer: COMMERCIAL

## 2025-04-08 VITALS
TEMPERATURE: 97.9 F | OXYGEN SATURATION: 94 % | HEART RATE: 87 BPM | RESPIRATION RATE: 16 BRPM | DIASTOLIC BLOOD PRESSURE: 86 MMHG | WEIGHT: 260.14 LBS | HEIGHT: 61 IN | SYSTOLIC BLOOD PRESSURE: 133 MMHG | BODY MASS INDEX: 49.12 KG/M2

## 2025-04-08 PROCEDURE — RXMED WILLOW AMBULATORY MEDICATION CHARGE

## 2025-04-08 PROCEDURE — 2500000001 HC RX 250 WO HCPCS SELF ADMINISTERED DRUGS (ALT 637 FOR MEDICARE OP): Mod: SE | Performed by: STUDENT IN AN ORGANIZED HEALTH CARE EDUCATION/TRAINING PROGRAM

## 2025-04-08 PROCEDURE — 2500000004 HC RX 250 GENERAL PHARMACY W/ HCPCS (ALT 636 FOR OP/ED): Mod: SE | Performed by: STUDENT IN AN ORGANIZED HEALTH CARE EDUCATION/TRAINING PROGRAM

## 2025-04-08 PROCEDURE — 99238 HOSP IP/OBS DSCHRG MGMT 30/<: CPT | Performed by: STUDENT IN AN ORGANIZED HEALTH CARE EDUCATION/TRAINING PROGRAM

## 2025-04-08 RX ORDER — OXYCODONE HYDROCHLORIDE 5 MG/1
5 TABLET ORAL EVERY 6 HOURS PRN
Qty: 5 TABLET | Refills: 0 | Status: SHIPPED | OUTPATIENT
Start: 2025-04-08

## 2025-04-08 RX ORDER — NALOXONE HYDROCHLORIDE 4 MG/.1ML
SPRAY NASAL
Qty: 2 EACH | Refills: 0 | OUTPATIENT
Start: 2025-04-08

## 2025-04-08 RX ADMIN — ENOXAPARIN SODIUM 60 MG: 100 INJECTION SUBCUTANEOUS at 11:07

## 2025-04-08 RX ADMIN — ACETAMINOPHEN 975 MG: 325 TABLET, FILM COATED ORAL at 11:07

## 2025-04-08 RX ADMIN — IBUPROFEN 600 MG: 600 TABLET ORAL at 04:42

## 2025-04-08 RX ADMIN — IBUPROFEN 600 MG: 600 TABLET ORAL at 11:07

## 2025-04-08 RX ADMIN — ACETAMINOPHEN 975 MG: 325 TABLET, FILM COATED ORAL at 04:42

## 2025-04-08 ASSESSMENT — PAIN SCALES - GENERAL
PAINLEVEL_OUTOF10: 4
PAINLEVEL_OUTOF10: 4
PAINLEVEL_OUTOF10: 2
PAINLEVEL_OUTOF10: 5 - MODERATE PAIN

## 2025-04-08 ASSESSMENT — PAIN DESCRIPTION - DESCRIPTORS: DESCRIPTORS: CRAMPING

## 2025-04-08 NOTE — CARE PLAN
The patient's goals for the shift include rest and bond with baby    The clinical goals for the shift include Vss throughout thios shift      Problem: Safety - Adult  Goal: Free from fall injury  Outcome: Met

## 2025-04-08 NOTE — DISCHARGE SUMMARY
Postpartum Discharge Summary    Admission Date: 2025  Discharge Date: 25     Discharge Diagnosis  Problem List Items Addressed This Visit       Uterine scar from previous  delivery affecting pregnancy ([facility])    Overview     PLTCS for breech presentation  Planning rLTCS         Relevant Medications    acetaminophen (Tylenol) 325 mg tablet    ibuprofen 600 mg tablet    polyethylene glycol (Glycolax, Miralax) 17 gram/dose powder    norethindrone (Micronor) 0.35 mg tablet    * (Principal) Non-reassuring fetal heart rate or rhythm affecting management of mother - Primary    Relevant Medications    acetaminophen (Tylenol) 325 mg tablet    ibuprofen 600 mg tablet    polyethylene glycol (Glycolax, Miralax) 17 gram/dose powder    norethindrone (Micronor) 0.35 mg tablet    Other Relevant Orders    Case Request Operating Room:  DELIVERY (Completed)     Other Visit Diagnoses        delivery delivered ([facility])        Relevant Medications    oxyCODONE (Roxicodone) 5 mg immediate release tablet             Myles Tompkins is a 29 y.o. female now  and postpartum day 3      Pregnancy notable for:  - hx of CS for breech  - asthma, rare albuterol use  - cHTN, no meds    Hospital Course  Admitted for [facility] in s/o [facility]  Delivery Date: 2025 3:38 AM  Delivery type: , Low Transverse   GA at delivery: 37w4d  Outcome: Living  Anesthesia during delivery: Combined spinal-epidural  Intrapartum complications: None  Feeding method: Breastfeeding Status: No     Delivery complications: none  Contraception at discharge: POPs    Complications Requiring Follow-Up  cHTN  - no meds  - asx  - HELLP labs negative on admission  - normotensive in PP period  - BP cuff for home at discharge, to monitor BID    Pertinent Subjective and Physical Exam At Time of Discharge  Patient seen at bedside - doing well and no acute events overnight. Pain well-controlled on current regimen - requiring oxycodone  for pain control (PDMP OARRS reviewed), lochia light, voiding spontaneously, passing flatus, ambulating independently, and tolerating PO.     General: well appearing, well-nourished, postpartum  Obstetric: abdomen soft/non-tender, fundus firm below umbilicus, lochia light, Pfannenstiel incision c/d/i  Skin: No rashes/lesions/erythema  Neuro: A/Ox3, conversational  GI: +BS, +flatus  Respiratory: Even and unlabored on RA, LSCTA BL  Cardiovascular: RRR, normal S1, S2  Extremities: No edema, discoloration, or pain in BLE, equal and palpable DP and PT pulses    Psych: appropriate mood and affect     Discharge Meds     Your medication list        START taking these medications        Instructions Last Dose Given Next Dose Due   acetaminophen 325 mg tablet  Commonly known as: Tylenol      Take 3 tablets (975 mg) by mouth every 6 hours.       Diane 0.35 mg tablet  Generic drug: norethindrone      Take 1 tablet (0.35 mg) over 28 days by mouth once daily.        mg tablet  Generic drug: ibuprofen      Take 1 tablet (600 mg) by mouth every 6 hours.       oxyCODONE 5 mg immediate release tablet  Commonly known as: Roxicodone      Take 1 tablet (5 mg) by mouth every 6 hours if needed (Pain score 6-8).       polyethylene glycol 17 gram/dose powder  Commonly known as: Glycolax, Miralax      Mix one capful (17 g) of powder in 8 ounces of liquid and drink once daily.              CONTINUE taking these medications        Instructions Last Dose Given Next Dose Due   albuterol 90 mcg/actuation inhaler  Commonly known as: Ventolin HFA      Inhale 1 puff every 6 hours if needed for wheezing.       Asmanex Twisthaler 220 mcg/ actuation (60) inhaler  Generic drug: mometasone      Inhale 2 puffs once daily.       blood pressure kit-extra large kit      1 each 2 times a day.       pantoprazole 20 mg EC tablet  Commonly known as: ProtoNix           Prenatal 28 mg iron- 800 mcg tablet  Generic drug: prenatal vitamin (iron-folic)       Take 1 tablet by mouth once daily.              STOP taking these medications      valACYclovir 500 mg tablet  Commonly known as: Valtrex               ASK your doctor about these medications        Instructions Last Dose Given Next Dose Due   naloxone 4 mg/0.1 mL nasal spray  Commonly known as: Narcan      Instill one spray in one nostril as needed for opioid overdose; may repeat in 5 minutes if no response.                 Where to Get Your Medications        These medications were sent to [facility]  [address] [place] [id number]      Hours: 8:30 AM to 5 PM Mon-Fri Phone: [phone]   acetaminophen 325 mg tablet  Diane 0.35 mg tablet   mg tablet  naloxone 4 mg/0.1 mL nasal spray  oxyCODONE 5 mg immediate release tablet  polyethylene glycol 17 gram/dose powder          Test Results Pending At Discharge  Pending Labs       Order Current Status    POCT UA (nonautomated) manually resulted In process    Surgical Pathology Exam - [facility] In process            Outpatient Follow-Up  Future Appointments   Date Time Provider Department Center   4/24/2025 10:40 AM Phu Guzman MD [id number] None     Message sent to [facility] to schedule 1 week for incision check/BP check and 4-6 weeks for routine postpartum visit  with [facility].    I spent 25 minutes in the professional and overall care of this patient.      Rina Prescott PA-C  04/08/25 1:24 PM  Sid

## 2025-04-08 NOTE — PROGRESS NOTES
Social Work Assessment     Patient: Myles Tompkins, 30yo,   Address: [address] [place] [address]  Phone: [phone]    Referral Reason: assessment    Prenatal Care: [facility] x 14   Barriers: none    [place] Name: Suraj MR# [id number]   : 25    Other Children: 5yo son Raphael    FOB: Ms Tompkins states FOB is not involved. She states they are not in a relationship and he is aware of pregnancy but not birth.     Household Composition: Ms Tompkins reports she lives alone with her children.     Supports: Ms Tompkins reports her mother, sister, and aunt are her supports. She states they are caring for Raphael this admission.    IPV/DV or Safety Concerns: Ms Tompkins reports she has a restraining order against FOB. She states she is safe at home and reports no recent contact. SW reviewed IPV risks and resources.     Car-Seat: yes  Safe Sleep Space: yes  Safe Sleep Education: reviewed - Ms Tompkins reports  is very fussy and wants to be held to sleep. SW reinforced safe sleep recommendations and encouraged Ms Tompkins to follow.     Transportation Concerns: none    School/Work/Income: Ms Tompkins report she receives food stamps and medical benefits. She states she plans to apply for WIC as a walk-in on Thursday. She states she has a can of formula at home already to use as needed until WIC benefits available. SW provided referrals for additional assistance with  items. Ms Tompkins denies food insecurity, transportation needs, and financial concerns at this time.     Insurance: [employer]    Mental Health Diagnoses/Concerns: Ms Tompkins denies signs and symptoms of depression. She states she feels her mood is good. She says she is coping with admission but hoping to go home today. SW reviewed postpartum depression signs, symptoms, and resources and  indicated understanding.    Bonding: Ms Tompkins was holding  and engaged with her. She denies bonding concerns.     Department of Children and Family Services  (DCFS): No history or concerns noted.     Assessment: SW met with Ms Tompkins for assessment. She was accepting and engaged. Ms Tompkins reports she has needed items for  including safe sleep and car seat. She also states she has good support from family including mother, sister, and aunt. Ms Tompkins denies depression. SW reviewed postpartum depression signs, symptoms, and resources and  Ms Tompkins indicated understanding.    Plan:  No concerns noted. Ms Tompkins and  clear from SW perspective when medically appropriate.     Signature: DANIELA Guerra

## 2025-04-11 LAB
LABORATORY COMMENT REPORT: NORMAL
PATH REPORT.FINAL DX SPEC: NORMAL
PATH REPORT.GROSS SPEC: NORMAL
PATH REPORT.RELEVANT HX SPEC: NORMAL
PATH REPORT.TOTAL CANCER: NORMAL

## 2025-04-15 ENCOUNTER — APPOINTMENT (OUTPATIENT)
Dept: OBSTETRICS AND GYNECOLOGY | Facility: CLINIC | Age: 30
End: 2025-04-15
Payer: COMMERCIAL

## 2025-05-16 ENCOUNTER — POSTPARTUM VISIT (OUTPATIENT)
Dept: OBSTETRICS AND GYNECOLOGY | Facility: CLINIC | Age: 30
End: 2025-05-16
Payer: COMMERCIAL

## 2025-05-16 VITALS
BODY MASS INDEX: 44.77 KG/M2 | SYSTOLIC BLOOD PRESSURE: 119 MMHG | HEART RATE: 72 BPM | WEIGHT: 236.8 LBS | DIASTOLIC BLOOD PRESSURE: 79 MMHG

## 2025-05-16 DIAGNOSIS — Z30.011 ENCOUNTER FOR INITIAL PRESCRIPTION OF CONTRACEPTIVE PILLS: ICD-10-CM

## 2025-05-16 PROBLEM — R73.9 HYPERGLYCEMIA: Status: ACTIVE | Noted: 2025-05-16

## 2025-05-16 PROCEDURE — RXMED WILLOW AMBULATORY MEDICATION CHARGE

## 2025-05-16 PROCEDURE — 99213 OFFICE O/P EST LOW 20 MIN: CPT | Performed by: ADVANCED PRACTICE MIDWIFE

## 2025-05-16 ASSESSMENT — EDINBURGH POSTNATAL DEPRESSION SCALE (EPDS)
I HAVE FELT SAD OR MISERABLE: NO, NOT AT ALL
I HAVE BLAMED MYSELF UNNECESSARILY WHEN THINGS WENT WRONG: NO, NEVER
I HAVE LOOKED FORWARD WITH ENJOYMENT TO THINGS: AS MUCH AS I EVER DID
I HAVE BEEN ANXIOUS OR WORRIED FOR NO GOOD REASON: YES, VERY OFTEN
TOTAL SCORE: 3
I HAVE BEEN ABLE TO LAUGH AND SEE THE FUNNY SIDE OF THINGS: AS MUCH AS I ALWAYS COULD
THE THOUGHT OF HARMING MYSELF HAS OCCURRED TO ME: NEVER
THINGS HAVE BEEN GETTING ON TOP OF ME: NO, I HAVE BEEN COPING AS WELL AS EVER
I HAVE BEEN SO UNHAPPY THAT I HAVE HAD DIFFICULTY SLEEPING: NOT AT ALL
I HAVE BEEN SO UNHAPPY THAT I HAVE BEEN CRYING: NO, NEVER
I HAVE FELT SCARED OR PANICKY FOR NO GOOD REASON: NO, NOT AT ALL

## 2025-05-16 ASSESSMENT — PAIN SCALES - GENERAL: PAINLEVEL_OUTOF10: 0-NO PAIN

## 2025-05-16 ASSESSMENT — ENCOUNTER SYMPTOMS
RESPIRATORY NEGATIVE: 0
HEMATOLOGIC/LYMPHATIC NEGATIVE: 0
CONSTITUTIONAL NEGATIVE: 0
NEUROLOGICAL NEGATIVE: 0
EYES NEGATIVE: 0
GASTROINTESTINAL NEGATIVE: 0
ALLERGIC/IMMUNOLOGIC NEGATIVE: 0
PSYCHIATRIC NEGATIVE: 0
CARDIOVASCULAR NEGATIVE: 0
ENDOCRINE NEGATIVE: 0
MUSCULOSKELETAL NEGATIVE: 0

## 2025-05-16 NOTE — PROGRESS NOTES
Subjective   30 y.o.  presenting for 6th week postpartum follow-up     Delivery Date: 2025  Gestational Age: 37w4d   Type of Delivery: , Low Transverse     Pregnancy Problems (from 24 to present)       Problem Noted Diagnosed Resolved    Positive GBS test 3/31/2025 by ROCKY Castro, CELIA-CNP  No    Priority:  Medium       Supervision of high risk pregnancy, antepartum ([facility]) 3/13/2025 by Regine Plaza MD  No    Priority:  Medium       Suspected fetal abnormality affecting management of mother ([facility]) 2/3/2025 by Laura Mckeon MD  No    Priority:  Medium       Overview Signed 2/10/2025  8:40 AM by Tr Gonzalez MD   A fetal echocardiogram for incomplete cardiac views on obstetric ultrasound, demonstrated grossly normal fetal cardiac anatomy         Obesity in pregnancy ([facility]) 2024 by ROCKY Lawson  No    Priority:  Medium       Overview Addendum 2/10/2025  9:09 AM by Tr Gonzalez MD   BMI = 45.75 at NOB  Fetal surveillance BMI >40 at NOB:  BPP or NST weekly 34 wks to delivery  Growth scans starting at 28 weeks for h/o FGR     Timing of delivery: 39 0/7 - 39 6/7 wks  *BMI >= 50 at any time in pregnancy: Deliver at Level 4         Uterine scar from previous  delivery affecting pregnancy ([facility]) 2024 by ROCKY Lawson  No    Priority:  Medium       Overview Addendum 2/10/2025  8:52 AM by Tr Gonzalez MD   PLTCS for breech presentation  Planning rLTCS         Previous baby with fetal growth restriction 2024 by ROCKY Lawson  No    Priority:  Medium       Overview Addendum 3/30/2025 11:53 AM by ROCKY Patterson   Growth US at 28, 32 and 36 wks- (30% AC, EFW 32%)         Herpes simplex type 2 (HSV-2) infection affecting pregnancy, antepartum ([facility]) 2024 by ROCKY Lawson  No    Priority:  Medium       Overview Addendum 3/20/2025  8:39 AM by Jeanine  PHUC Negrete MD   Diagnosed in 2019  On valtrex         37 weeks gestation of pregnancy ([facility]) 9/18/2024 by ROCKY Lawson  No    Priority:  Medium       Overview Addendum 4/3/2025  9:54 AM by Juice Bates MD   Desired provider in labor: [] CNM  [x] Physician  - planning rCS  [x] Blood Products: [x] Yes, accepts [] No, needs counseling  [x] Initial BMI: 45.75   [x] Prenatal Labs: WNL  [x] Cervical Cancer Screening up to date: 9/18/24   [x] Rh status: O+  [x] Genetic Screening:  declined 9/30  [x] NT US: (11-13 wks): WNL  [x] Baby ASA (if indicated): patient declines  [x] Pregnancy dated by: LMP c/w 13w US    [x] Anatomy US (19-20 wks): fetal cardiac views not completed, fetal echo scheduled for 2/3  [x] Federal Sterilization consent signed (if indicated): signed 2/24  [x] 1hr GCT at 24-28wks: wnl   [x] Fetal Surveillance (if indicated): Growth US at 28/32/36 weeks in setting of prior FGR; BPP/NST weekly starting at 34 weeks  [x] Tdap (27-32 wks, may be given up to 36 wks if initial window missed): administered on 1/30  [x] Flu Vaccine: Oct 2024 per patient     [] Breastfeeding: Not breastfeeding  [x] Postpartum Birth control method: desires ppTL (consent signed 2/24), planning to visit [email or web address] to review backup plans   [x] GBS at 36 - 37 wks: GBS Positive 3/28  [x] Mode of delivery ( anticipated ): rCS- scheduled 4/15           Chronic hypertension affecting pregnancy ([facility])  by ROCKY Patterson  No    Priority:  Medium       Overview Addendum 11/11/2024 10:24 AM by Ellis Hutchins MD   No meds  Discussed ASA; patient declines due to previous lightheadedness in prior pregnancy   Baseline HELLP labs WNL at new OB, P/c ratio 0.07          Asthma affecting pregnancy, antepartum ([facility]) 1995 by Phu Guzman MD  No    Priority:  Medium       Overview Addendum 3/20/2025  8:40 AM by Jeanine Negrete MD   No hospitalizations or intubations per patient's  recollection   mometasone and albuterol prn          Request for sterilization 3/6/2025 by Jeanine Negrete MD  No    Overview Signed 3/28/2025 12:14 PM by CELIA Castro-CNM, APRMICHELE-CNP   Consent signed 25, scanned into Media tab                 Concerns: none, had great and smooth birth experience, needs a refill for OCP's    Pain: not in pain, reported healed low transverse caesarian incision and no infection signs  Lacerations: n/a  Lochia: none, currently having her first menstrual period postpartum, reported heavy flow on 1st day.    Sexual Intimacy: No, not resumed sexual intercourse   Contraceptive Method: POP  Feeding Method: She is bottle feeding. Formula only, no breast or nursing problems  Lactation Consult Needed?: No  Good bowel movement.     Birth Trauma: No  Bonding with Baby: well with baby girl, Reid lyons    Mood: no mood changes.   Postpartum Depression: Low Risk  (2025)    [place]  Depression Scale     Last EPDS Total Score: 3     Last EPDS Self Harm Result: Never     Last pap: 2024 (NILM)    Objective    /79   Pulse 72   Wt 107 kg (236 lb 12.8 oz)   LMP 2024   Breastfeeding No   BMI 44.77 kg/m²    Physical Exam  Constitutional:       Appearance: Normal appearance.   Genitourinary:      Genitourinary Comments: Deferred   Cardiovascular:      Rate and Rhythm: Normal rate and regular rhythm.      Heart sounds: Normal heart sounds.   Pulmonary:      Effort: Pulmonary effort is normal.      Breath sounds: Normal breath sounds.   Abdominal:      Comments: Healed low transverse incision    Musculoskeletal:         General: Normal range of motion.      Cervical back: Normal range of motion.   Neurological:      General: No focal deficit present.      Mental Status: She is alert and oriented to person, place, and time. Mental status is at baseline.   Skin:     General: Skin is warm.   Psychiatric:         Mood and Affect: Mood normal.         Behavior:  Behavior normal.         Thought Content: Thought content normal.         Judgment: Judgment normal.   Vitals and nursing note reviewed.         Plan    Advised to call office for breast complaints, abnormal bleeding, mood changes, or other concerning symptoms.   Cleared to resume normal activity as desired  Encourage to follow-up on next annual visit or sooner if needed    Problem List Items Addressed This Visit    None  Visit Diagnoses         Codes      Encounter for initial prescription of contraceptive pills    -  Primary Z30.011    Relevant Medications    drospirenone, contraceptive, 4 mg (28) tablet            [place], 05/16/2025  I was present with the APRN student who participated in the documentation of this note. I have personally seen and re-examined the patient and performed the medical decision-making components (assessment and plan of care). I have reviewed the APRN student documentation and verified the findings in the note as written with additions or exceptions as stated in the body of this note.    Angela Daniels, CELIA-CITLALIM

## 2025-05-19 ENCOUNTER — PHARMACY VISIT (OUTPATIENT)
Dept: PHARMACY | Facility: CLINIC | Age: 30
End: 2025-05-19
Payer: MEDICAID

## 2025-07-09 ENCOUNTER — HOSPITAL ENCOUNTER (EMERGENCY)
Facility: HOSPITAL | Age: 30
Discharge: HOME | End: 2025-07-10
Attending: EMERGENCY MEDICINE
Payer: COMMERCIAL

## 2025-07-09 ENCOUNTER — OFFICE VISIT (OUTPATIENT)
Facility: HOSPITAL | Age: 30
End: 2025-07-09
Payer: COMMERCIAL

## 2025-07-09 VITALS
HEART RATE: 98 BPM | WEIGHT: 240 LBS | TEMPERATURE: 97.6 F | DIASTOLIC BLOOD PRESSURE: 87 MMHG | SYSTOLIC BLOOD PRESSURE: 155 MMHG | HEIGHT: 61 IN | RESPIRATION RATE: 18 BRPM | OXYGEN SATURATION: 97 % | BODY MASS INDEX: 45.31 KG/M2

## 2025-07-09 VITALS
WEIGHT: 240 LBS | TEMPERATURE: 98 F | RESPIRATION RATE: 18 BRPM | DIASTOLIC BLOOD PRESSURE: 84 MMHG | HEART RATE: 83 BPM | OXYGEN SATURATION: 98 % | SYSTOLIC BLOOD PRESSURE: 126 MMHG | BODY MASS INDEX: 45.31 KG/M2 | HEIGHT: 61 IN

## 2025-07-09 DIAGNOSIS — L50.9 HIVES: Primary | ICD-10-CM

## 2025-07-09 DIAGNOSIS — Z00.00 ROUTINE GENERAL MEDICAL EXAMINATION AT A HEALTH CARE FACILITY: Primary | ICD-10-CM

## 2025-07-09 DIAGNOSIS — E66.01 MORBID OBESITY (MULTI): ICD-10-CM

## 2025-07-09 PROCEDURE — 2500000004 HC RX 250 GENERAL PHARMACY W/ HCPCS (ALT 636 FOR OP/ED): Mod: SE

## 2025-07-09 PROCEDURE — 99395 PREV VISIT EST AGE 18-39: CPT | Performed by: STUDENT IN AN ORGANIZED HEALTH CARE EDUCATION/TRAINING PROGRAM

## 2025-07-09 PROCEDURE — 96372 THER/PROPH/DIAG INJ SC/IM: CPT

## 2025-07-09 PROCEDURE — 2500000001 HC RX 250 WO HCPCS SELF ADMINISTERED DRUGS (ALT 637 FOR MEDICARE OP): Mod: SE

## 2025-07-09 PROCEDURE — 3074F SYST BP LT 130 MM HG: CPT | Performed by: STUDENT IN AN ORGANIZED HEALTH CARE EDUCATION/TRAINING PROGRAM

## 2025-07-09 PROCEDURE — 99284 EMERGENCY DEPT VISIT MOD MDM: CPT | Mod: 27 | Performed by: EMERGENCY MEDICINE

## 2025-07-09 PROCEDURE — 3008F BODY MASS INDEX DOCD: CPT | Performed by: STUDENT IN AN ORGANIZED HEALTH CARE EDUCATION/TRAINING PROGRAM

## 2025-07-09 PROCEDURE — 3079F DIAST BP 80-89 MM HG: CPT | Performed by: STUDENT IN AN ORGANIZED HEALTH CARE EDUCATION/TRAINING PROGRAM

## 2025-07-09 RX ORDER — FAMOTIDINE 40 MG/1
40 TABLET, FILM COATED ORAL ONCE
Status: COMPLETED | OUTPATIENT
Start: 2025-07-09 | End: 2025-07-09

## 2025-07-09 RX ORDER — DIPHENHYDRAMINE HYDROCHLORIDE 50 MG/ML
50 INJECTION, SOLUTION INTRAMUSCULAR; INTRAVENOUS ONCE
Status: COMPLETED | OUTPATIENT
Start: 2025-07-09 | End: 2025-07-09

## 2025-07-09 RX ORDER — DEXAMETHASONE SODIUM PHOSPHATE 10 MG/ML
10 INJECTION INTRAMUSCULAR; INTRAVENOUS ONCE
Status: COMPLETED | OUTPATIENT
Start: 2025-07-09 | End: 2025-07-09

## 2025-07-09 RX ADMIN — DIPHENHYDRAMINE HYDROCHLORIDE 50 MG: 50 INJECTION INTRAMUSCULAR; INTRAVENOUS at 23:17

## 2025-07-09 RX ADMIN — DEXAMETHASONE SODIUM PHOSPHATE 10 MG: 10 INJECTION INTRAMUSCULAR; INTRAVENOUS at 23:17

## 2025-07-09 RX ADMIN — FAMOTIDINE 40 MG: 40 TABLET, FILM COATED ORAL at 23:17

## 2025-07-09 ASSESSMENT — PAIN SCALES - GENERAL: PAINLEVEL_OUTOF10: 0-NO PAIN

## 2025-07-09 NOTE — PROGRESS NOTES
"  Subjective   Patient ID: Myles Tompkins is a 30 y.o. female who presents for Annual Exam.     HPI   Pt is in no acute distress on evaluation today. She had an emergency  on May 16th and her new child is currently in great health. Following this she was reinitiated on drospirenone 4mg for birth control, but notes that she often struggles to remember to take her medication. She also notes that she is now ready to start losing weight, but has previously struggled with weight loss. She reports that she is currently eating one large meal per day.      Denies CP, SOB, N/V/D/C, headache, chills, fever, abdominal pain, and changes in urination.       Review of Systems    Objective   /84 (BP Location: Right arm, Patient Position: Sitting, BP Cuff Size: Adult)   Pulse 83   Temp 36.7 °C (98 °F) (Temporal)   Resp 18   Ht (!) 1.549 m (5' 1\")   Wt 109 kg (240 lb)   SpO2 98%   BMI 45.35 kg/m²         Physical Exam  Constitutional:       General: She is not in acute distress.     Appearance: She is obese.   HENT:      Head: Normocephalic and atraumatic.      Mouth/Throat:      Mouth: Mucous membranes are moist.   Cardiovascular:      Rate and Rhythm: Normal rate and regular rhythm.   Pulmonary:      Effort: Pulmonary effort is normal.      Breath sounds: Normal breath sounds.   Abdominal:      General: Abdomen is flat. Bowel sounds are normal. There is no distension.      Palpations: Abdomen is soft.      Tenderness: There is no abdominal tenderness. There is no guarding.   Musculoskeletal:      Cervical back: Normal range of motion and neck supple.   Neurological:      Mental Status: She is alert.         Assessment/Plan   Problem List Items Addressed This Visit    None  Visit Diagnoses         Codes      Routine general medical examination at a health care facility    -  Primary Z00.00      Morbid obesity (Multi)     E66.01    Relevant Orders    Hemoglobin A1c (Completed)    Lipid Panel (Completed) "    Referral to [facility]          Assessment:  Myles Tompkins is a 30 y.o. female who presents for Annual Exam. She had an emergency  on May 16th and her new child is currently in great health. She struggles to remember her birth control medication, and wishes to pursue implantable alternatives. She is also interested in weight loss, but has previously struggled. She is a good candidate for the [employer] [facility].        Plan:    Birth Control:  -Non compliant with Drospirenone 4mg  -Will refer to gynecologist for implantable birth control options     Obesity:  -BMI at visit: 45.35  -A1c and lipid panel ordered  -Referred to nutritionist   -Referred to the [employer] [facility]      Donn Oropeza - MS3    Note: This documentaion was entered into the electronic medical record using [employer] medical dictation software, and was not necessarily edited thereafter. Please excuse any errors of spelling or grammar.    I was present with the medical student who participated in the documentation of this note.  I have personally seen and examined the patient and performed the medical decision-making components. I have reviewed the medical student documentation and/or resident documentation and verified the findings in the note as written with additions or exceptions as stated in the body of the note.    Phu Guzman MD

## 2025-07-10 LAB
CHOLEST SERPL-MCNC: 124 MG/DL
CHOLEST/HDLC SERPL: 3.4 (CALC)
EST. AVERAGE GLUCOSE BLD GHB EST-MCNC: 103 MG/DL
EST. AVERAGE GLUCOSE BLD GHB EST-SCNC: 5.7 MMOL/L
HBA1C MFR BLD: 5.2 %
HDLC SERPL-MCNC: 37 MG/DL
LDLC SERPL CALC-MCNC: 70 MG/DL (CALC)
NONHDLC SERPL-MCNC: 87 MG/DL (CALC)
TRIGL SERPL-MCNC: 88 MG/DL

## 2025-07-10 PROCEDURE — RXMED WILLOW AMBULATORY MEDICATION CHARGE

## 2025-07-10 RX ORDER — PREDNISONE 10 MG/1
20 TABLET ORAL DAILY
Qty: 8 TABLET | Refills: 0 | Status: SHIPPED | OUTPATIENT
Start: 2025-07-10 | End: 2025-07-15

## 2025-07-10 RX ORDER — DIPHENHYDRAMINE HCL 25 MG
25 CAPSULE ORAL EVERY 6 HOURS PRN
Qty: 12 CAPSULE | Refills: 0 | Status: SHIPPED | OUTPATIENT
Start: 2025-07-10 | End: 2025-07-14

## 2025-07-10 NOTE — ED TRIAGE NOTES
Patient has hives that started yesterday and got worse at work today. Patient works for [employer]. Patient is not allergic to anything besides bee pollen.

## 2025-07-10 NOTE — DISCHARGE INSTRUCTIONS
You were seen emergency department for rash.  This is most likely hives, we will write a prescription for Benadryl which should help with itching and a few days of steroids which should help keep it from getting worse.  This should self resolve within a few days, if this gets worse or you have any other immediate concerns such as trouble breathing or swallowing please return to the emergency department.

## 2025-07-11 ENCOUNTER — PHARMACY VISIT (OUTPATIENT)
Dept: PHARMACY | Facility: CLINIC | Age: 30
End: 2025-07-11
Payer: MEDICAID

## 2025-07-11 NOTE — ED PROVIDER NOTES
Emergency Department Provider Note       History of Present Illness     History provided by: Patient  Limitations to History: None  External Records Reviewed with Brief Summary: None    HPI:  Myles Tompkins is a 30 y.o. female presenting with diffuse itching and rash.  Patient reports the itching started yesterday followed by diffuse rash that has gotten worse today.  Patient works for [employer], therefore has exposure to various cleaning products.  Patient denies any known allergies besides bee pollen.  Patient reports she first noticed the rash on her face, and then spread to all 4 extremities abdomen and back.  Denies any shortness of breath, fevers, trouble managing secretions, or voice changes.  Denies any numbness tingling to the back of her throat.  Patient reports she believes these are hives, as she has had a similar rash before that ended up being hives.     Physical Exam   Triage vitals:  T 36.4 °C (97.6 °F)  HR 98  /87  RR 18  O2 97 % None (Room air)    General: Awake, alert, in no acute distress  Eyes: Gaze conjugate.  No scleral icterus or injection  HENT: Normo-cephalic, atraumatic. No stridor  CV: Regular rate, regular rhythm. Radial pulses 2+ bilaterally  Resp: Breathing non-labored, speaking in full sentences.  Clear to auscultation bilaterally  GI: Soft, non-distended, non-tender. No rebound or guarding.  MSK/Extremities: No gross bony deformities. Moving all extremities  Skin: Red wheals diffusely scattered in bilateral upper lower extremities along with chest abdomen back and face.  They do not appear raised, appears consistent with urticaria.  No evidence of skin sloughing or bulla or blisters.  Neuro: Alert. Oriented. Face symmetric. Speech is fluent.  Gross strength and sensation intact in b/l UE and LEs  Psych: Appropriate mood and affect      Medical Decision Making & ED Course   Medical Decision Makin y.o. female presenting with diffuse rash and itching.  On exam patient  heme a stable, overall well-appearing, satting adequate in room air with no evidence of increased work of breathing.  There is diffuse urticarial-like rash, but no evidence of airway involvement at this time, no tongue or lip swelling, patient denies any numbness tingling in the back of her throat.  Lungs are clear to auscultation, with no wheezing noted.  Patient was given IM Benadryl and steroids, with significant improvement in her itching and rash.  Low suspicion for acute bacterial skin infection or other infectious pathology, given she is overall well-appearing, afebrile, no disclamation or bulla on the skin, and that her rash is significant deviated by the Benadryl.  No indication for further testing at this time, given that she her symptoms are mostly resolved, patient was given prescription for Benadryl, and a 3-day course of steroids, and strict return precautions to watch out for any airway involvement.  Patient feels comfortable being discharged home at this time, and on reexamination patient still not endorsing any airway involvement, and feels the rash has nearly resolved.  ----      Differential diagnoses considered include but are not limited to: Allergic reaction, urticaria, angioedema, anaphylaxis     Social Determinants of Health which Significantly Impact Care: Social Determinants of Health which Significantly Impact Care: None identified       Independent Result Review and Interpretation: None obtained    Chronic conditions affecting the patient's care: As documented above in [facility]    The patient was discussed with the following consultants/services: None    Care Considerations: As documented above in [facility]    ED Course:  Diagnoses as of 07/10/25 2223   Hives       Disposition   As a result of the work-up, the patient was discharged home.  she was informed of her diagnosis and instructed to come back with any concerns or worsening of condition.  she and was agreeable to the plan as discussed above.   she was given the opportunity to ask questions.  All of the patient's questions were answered.    Procedures   Procedures    Patient seen and discussed with ED attending physician.    Souleymane Mina MD  Emergency Medicine                                                       Souleymane Mina MD  Resident  07/10/25 [id number]

## 2025-07-25 DIAGNOSIS — L50.9 HIVES: ICD-10-CM

## 2025-07-25 PROCEDURE — RXMED WILLOW AMBULATORY MEDICATION CHARGE

## 2025-07-25 RX ORDER — DIPHENHYDRAMINE HCL 25 MG
25 CAPSULE ORAL EVERY 6 HOURS PRN
Qty: 12 CAPSULE | Refills: 0 | Status: SHIPPED | OUTPATIENT
Start: 2025-07-25 | End: 2025-07-28

## 2025-07-29 ENCOUNTER — PHARMACY VISIT (OUTPATIENT)
Dept: PHARMACY | Facility: CLINIC | Age: 30
End: 2025-07-29
Payer: MEDICAID

## 2025-07-31 ENCOUNTER — PHARMACY VISIT (OUTPATIENT)
Dept: PHARMACY | Facility: CLINIC | Age: 30
End: 2025-07-31

## 2025-07-31 PROCEDURE — RXOTC WILLOW AMBULATORY OTC CHARGE

## 2025-08-01 ENCOUNTER — APPOINTMENT (OUTPATIENT)
Facility: CLINIC | Age: 30
End: 2025-08-01
Payer: COMMERCIAL

## 2025-08-01 ENCOUNTER — APPOINTMENT (OUTPATIENT)
Facility: HOSPITAL | Age: 30
End: 2025-08-01
Payer: COMMERCIAL

## 2025-08-08 ENCOUNTER — OFFICE VISIT (OUTPATIENT)
Facility: HOSPITAL | Age: 30
End: 2025-08-08
Payer: COMMERCIAL

## 2025-08-08 VITALS
HEART RATE: 83 BPM | HEIGHT: 61 IN | TEMPERATURE: 97 F | BODY MASS INDEX: 45.12 KG/M2 | OXYGEN SATURATION: 95 % | SYSTOLIC BLOOD PRESSURE: 123 MMHG | DIASTOLIC BLOOD PRESSURE: 83 MMHG | WEIGHT: 239 LBS

## 2025-08-08 DIAGNOSIS — Z30.017 NEXPLANON INSERTION: ICD-10-CM

## 2025-08-08 LAB — PREGNANCY TEST URINE, POC: NEGATIVE

## 2025-08-08 PROCEDURE — 11981 INSERTION DRUG DLVR IMPLANT: CPT | Performed by: STUDENT IN AN ORGANIZED HEALTH CARE EDUCATION/TRAINING PROGRAM

## 2025-08-08 PROCEDURE — 99213 OFFICE O/P EST LOW 20 MIN: CPT

## 2025-08-08 PROCEDURE — 3079F DIAST BP 80-89 MM HG: CPT | Performed by: STUDENT IN AN ORGANIZED HEALTH CARE EDUCATION/TRAINING PROGRAM

## 2025-08-08 PROCEDURE — 3074F SYST BP LT 130 MM HG: CPT | Performed by: STUDENT IN AN ORGANIZED HEALTH CARE EDUCATION/TRAINING PROGRAM

## 2025-08-08 PROCEDURE — 3008F BODY MASS INDEX DOCD: CPT | Performed by: STUDENT IN AN ORGANIZED HEALTH CARE EDUCATION/TRAINING PROGRAM

## 2025-08-08 PROCEDURE — 2500000004 HC RX 250 GENERAL PHARMACY W/ HCPCS (ALT 636 FOR OP/ED): Performed by: STUDENT IN AN ORGANIZED HEALTH CARE EDUCATION/TRAINING PROGRAM

## 2025-08-08 PROCEDURE — 81025 URINE PREGNANCY TEST: CPT | Performed by: STUDENT IN AN ORGANIZED HEALTH CARE EDUCATION/TRAINING PROGRAM

## 2025-08-08 ASSESSMENT — PAIN SCALES - GENERAL: PAINLEVEL_OUTOF10: 0-NO PAIN

## 2025-08-08 NOTE — PROGRESS NOTES
"  Subjective   Patient ID: Myles Tompkins is a 30 y.o. female who presents for Contraception (Pt is here for Nexplanon insertion. ).     Patient ID: Myles Tompkins is a 30 y.o. female.    Procedures    Nexplanon Removal and Reinsertion  PRE-OP DIAGNOSIS: desires long-term, reversible contraception   POST-OP DIAGNOSIS: Same   PROCEDURE: Nexplanon® removal and reinsertion  Performing Physician: Phu Guzman MD    PROCEDURE:   Site:  left  Serial # [id number]  EXP # 2027-06  LOT 1 # [id number]  [id number]    Time Out performed; Correct patient, Correct procedure, Correct site and side, and Patient consented.    The distal LUE was prepared with Betadine.  The site was anesthetized with 1% lidocaine with epinephrine (5 ml).  Anesthesia was confirmed.  A skin incision was made over the distal aspect of the device. Nexplanon® trocar was inserted subcutaneously through the same skin incision and then Nexplanon® capsule delivered subcutaneously.  Trocar was removed from the insertion site.  Nexplanon® capsule was palpated by provider and patient to assure satisfactory placement.    Estimated blood loss: 0 mL  Dressings applied:  steri strip, adhesive dressing, and ACE wrap for swelling prevention placed.  Followup: The patient tolerated the procedure well without  complications.  Standard post-procedure care is explained and return precautions are given.    As above.         Review of Systems   All other systems reviewed and are negative.      Objective   /83 (BP Location: Right arm, Patient Position: Sitting)   Pulse 83   Temp 36.1 °C (97 °F) (Temporal)   Ht (!) 1.549 m (5' 1\")   Wt 108 kg (239 lb)   SpO2 95%   BMI 45.16 kg/m²     Physical Exam  Vitals reviewed.   Constitutional:       Appearance: Normal appearance.   HENT:      Head: Normocephalic and atraumatic.      Mouth/Throat:      Mouth: Mucous membranes are moist.      Pharynx: Oropharynx is clear.     Eyes:      Extraocular Movements: " Extraocular movements intact.      Conjunctiva/sclera: Conjunctivae normal.      Pupils: Pupils are equal, round, and reactive to light.       Cardiovascular:      Rate and Rhythm: Normal rate and regular rhythm.      Pulses: Normal pulses.      Heart sounds: Normal heart sounds.   Pulmonary:      Effort: Pulmonary effort is normal.      Breath sounds: Normal breath sounds.   Abdominal:      General: Abdomen is flat. Bowel sounds are normal.      Palpations: Abdomen is soft.     Musculoskeletal:         General: Normal range of motion.      Cervical back: Normal range of motion and neck supple.     Skin:     General: Skin is warm and dry.      Capillary Refill: Capillary refill takes less than 2 seconds.     Neurological:      General: No focal deficit present.      Mental Status: She is alert.     Psychiatric:         Mood and Affect: Mood normal.         Behavior: Behavior normal.         Assessment/Plan   Problem List Items Addressed This Visit    None  Visit Diagnoses         Codes      Nexplanon insertion     Z30.017    Relevant Orders    POCT Pregnancy, Urine manually resulted (Completed)               Note: This documentaion was entered into the electronic medical record using [employer] medical dictation software, and was not necessarily edited thereafter. Please excuse any errors of spelling or grammar.

## 2025-08-11 DIAGNOSIS — L50.9 HIVES: ICD-10-CM

## 2025-08-11 RX ORDER — DIPHENHYDRAMINE HCL 25 MG
25 CAPSULE ORAL EVERY 6 HOURS PRN
Qty: 12 CAPSULE | Refills: 0 | Status: SHIPPED | OUTPATIENT
Start: 2025-08-11 | End: 2025-08-17

## 2025-08-12 PROCEDURE — RXMED WILLOW AMBULATORY MEDICATION CHARGE

## 2025-08-12 RX ADMIN — ETONOGESTREL 1 EACH: 68 IMPLANT SUBCUTANEOUS at 09:28

## 2025-08-14 ENCOUNTER — PHARMACY VISIT (OUTPATIENT)
Dept: PHARMACY | Facility: CLINIC | Age: 30
End: 2025-08-14
Payer: MEDICAID

## 2025-08-22 ENCOUNTER — APPOINTMENT (OUTPATIENT)
Facility: CLINIC | Age: 30
End: 2025-08-22
Payer: COMMERCIAL

## 2025-08-22 VITALS
TEMPERATURE: 97.7 F | HEART RATE: 79 BPM | OXYGEN SATURATION: 97 % | HEIGHT: 61 IN | SYSTOLIC BLOOD PRESSURE: 107 MMHG | BODY MASS INDEX: 44.71 KG/M2 | DIASTOLIC BLOOD PRESSURE: 73 MMHG | WEIGHT: 236.8 LBS

## 2025-08-22 DIAGNOSIS — R53.82 CHRONIC FATIGUE: ICD-10-CM

## 2025-08-22 DIAGNOSIS — M54.6 CHRONIC BILATERAL THORACIC BACK PAIN: ICD-10-CM

## 2025-08-22 DIAGNOSIS — E66.813 CLASS 3 SEVERE OBESITY DUE TO EXCESS CALORIES WITHOUT SERIOUS COMORBIDITY WITH BODY MASS INDEX (BMI) OF 40.0 TO 44.9 IN ADULT: Primary | ICD-10-CM

## 2025-08-22 DIAGNOSIS — G89.29 CHRONIC BILATERAL THORACIC BACK PAIN: ICD-10-CM

## 2025-08-22 DIAGNOSIS — M25.562 CHRONIC PAIN OF LEFT KNEE: ICD-10-CM

## 2025-08-22 DIAGNOSIS — G89.29 CHRONIC PAIN OF LEFT KNEE: ICD-10-CM

## 2025-08-22 PROCEDURE — 3074F SYST BP LT 130 MM HG: CPT | Performed by: NURSE PRACTITIONER

## 2025-08-22 PROCEDURE — 3008F BODY MASS INDEX DOCD: CPT | Performed by: NURSE PRACTITIONER

## 2025-08-22 PROCEDURE — 3078F DIAST BP <80 MM HG: CPT | Performed by: NURSE PRACTITIONER

## 2025-08-22 PROCEDURE — 99213 OFFICE O/P EST LOW 20 MIN: CPT | Performed by: NURSE PRACTITIONER

## 2025-08-22 PROCEDURE — 1036F TOBACCO NON-USER: CPT | Performed by: NURSE PRACTITIONER

## 2025-08-22 ASSESSMENT — ENCOUNTER SYMPTOMS
ARTHRALGIAS: 1
FATIGUE: 1
BACK PAIN: 1

## 2025-08-22 ASSESSMENT — PAIN SCALES - GENERAL: PAINLEVEL_OUTOF10: 0-NO PAIN

## (undated) DEVICE — DRAPE PACK, CESAREAN SECTION, CUSTOM, UHC

## (undated) DEVICE — SUTURE, VICRYL, 0, 36 IN, CT, UNDYED

## (undated) DEVICE — SUTURE, MONOCRYL, 2-0, 36 IN, CTX, VIOLET

## (undated) DEVICE — SUTURE, MONOCRYL PLUS, 4-0, PS-2 UD 27IN

## (undated) DEVICE — GLOVE, SURGICAL, PROTEXIS PI MICRO, 7.5, PF, LF

## (undated) DEVICE — SUTURE, PDS II, 0 36 IN, CT-1, VIOLET

## (undated) DEVICE — GLOVE, SURGICAL, PROTEXIS PI BLUE W/NEUTHERA, 7.5, PF, LF